# Patient Record
Sex: FEMALE | Race: WHITE | NOT HISPANIC OR LATINO | Employment: OTHER | URBAN - METROPOLITAN AREA
[De-identification: names, ages, dates, MRNs, and addresses within clinical notes are randomized per-mention and may not be internally consistent; named-entity substitution may affect disease eponyms.]

---

## 2017-11-17 ENCOUNTER — APPOINTMENT (OUTPATIENT)
Dept: PREADMISSION TESTING | Facility: HOSPITAL | Age: 62
End: 2017-11-17

## 2017-11-17 VITALS — HEIGHT: 64 IN | BODY MASS INDEX: 33.12 KG/M2 | WEIGHT: 194 LBS

## 2017-11-17 LAB
DEPRECATED RDW RBC AUTO: 44.4 FL (ref 37–54)
ERYTHROCYTE [DISTWIDTH] IN BLOOD BY AUTOMATED COUNT: 12.9 % (ref 11.3–14.5)
HBA1C MFR BLD: 5.8 % (ref 4.8–5.6)
HCT VFR BLD AUTO: 36.7 % (ref 34.5–44)
HGB BLD-MCNC: 12.1 G/DL (ref 11.5–15.5)
MCH RBC QN AUTO: 31.1 PG (ref 27–31)
MCHC RBC AUTO-ENTMCNC: 33 G/DL (ref 32–36)
MCV RBC AUTO: 94.3 FL (ref 80–99)
PLATELET # BLD AUTO: 233 10*3/MM3 (ref 150–450)
PMV BLD AUTO: 9.6 FL (ref 6–12)
POTASSIUM BLD-SCNC: 4.6 MMOL/L (ref 3.5–5.5)
RBC # BLD AUTO: 3.89 10*6/MM3 (ref 3.89–5.14)
WBC NRBC COR # BLD: 5.91 10*3/MM3 (ref 3.5–10.8)

## 2017-11-17 PROCEDURE — 93005 ELECTROCARDIOGRAM TRACING: CPT

## 2017-11-17 PROCEDURE — 93010 ELECTROCARDIOGRAM REPORT: CPT | Performed by: INTERNAL MEDICINE

## 2017-11-17 PROCEDURE — 84132 ASSAY OF SERUM POTASSIUM: CPT | Performed by: ANESTHESIOLOGY

## 2017-11-17 PROCEDURE — 85027 COMPLETE CBC AUTOMATED: CPT | Performed by: ANESTHESIOLOGY

## 2017-11-17 PROCEDURE — 83036 HEMOGLOBIN GLYCOSYLATED A1C: CPT | Performed by: ANESTHESIOLOGY

## 2017-11-17 PROCEDURE — 36415 COLL VENOUS BLD VENIPUNCTURE: CPT

## 2017-11-17 RX ORDER — HYDROCODONE BITARTRATE AND ACETAMINOPHEN 7.5; 325 MG/1; MG/1
1 TABLET ORAL EVERY 8 HOURS PRN
COMMUNITY
End: 2017-11-25 | Stop reason: HOSPADM

## 2017-11-17 RX ORDER — PRAVASTATIN SODIUM 20 MG
20 TABLET ORAL DAILY
COMMUNITY

## 2017-11-17 RX ORDER — PANTOPRAZOLE SODIUM 40 MG/1
40 TABLET, DELAYED RELEASE ORAL DAILY
COMMUNITY
End: 2019-10-08

## 2017-11-17 RX ORDER — ASPIRIN 81 MG/1
81 TABLET ORAL DAILY
Status: ON HOLD | COMMUNITY
End: 2017-11-25

## 2017-11-17 RX ORDER — SOLIFENACIN SUCCINATE 10 MG/1
10 TABLET, FILM COATED ORAL DAILY
COMMUNITY
End: 2019-10-08

## 2017-11-17 RX ORDER — TEMAZEPAM 30 MG/1
30 CAPSULE ORAL NIGHTLY PRN
COMMUNITY

## 2017-11-17 RX ORDER — LEVOCETIRIZINE DIHYDROCHLORIDE 5 MG/1
5 TABLET, FILM COATED ORAL EVERY EVENING
COMMUNITY

## 2017-11-17 RX ORDER — FLUOXETINE HYDROCHLORIDE 20 MG/1
20 CAPSULE ORAL DAILY
COMMUNITY

## 2017-11-17 RX ORDER — MELATONIN
1000 DAILY
COMMUNITY
End: 2019-10-08

## 2017-11-17 NOTE — PAT
ABNORMAL EKG REVIEWED WITH DR DIAZ. CARDIOLOGY CONSULT ORDERED. PATIENT SEEN BY DR ROLLINS IN 2011.     APPOINTMENT SCHEDULED WITH DR ROLLINS FOR Tuesday 11/21/2017 AT 1415 FOR CARDIAC CLEARANCE. PATIENT AND  VERBALIZED UNDERSTANDING     Measured for TEDS/SCDS in PAT-     calf measurement      15    Length measurement  18

## 2017-11-17 NOTE — PAT
MESSAGE LEFT FOR SHABANA IN DR ARELLANO'S OFFICE REGARDING PATIENT'S APPOINTMENT WITH DR ROLLINS 11/21/2017 FOR CARDIAC CLEARANCE.

## 2017-11-24 ENCOUNTER — APPOINTMENT (OUTPATIENT)
Dept: GENERAL RADIOLOGY | Facility: HOSPITAL | Age: 62
End: 2017-11-24

## 2017-11-24 ENCOUNTER — HOSPITAL ENCOUNTER (OUTPATIENT)
Facility: HOSPITAL | Age: 62
Discharge: HOME | End: 2017-11-25
Attending: ORTHOPAEDIC SURGERY | Admitting: ORTHOPAEDIC SURGERY

## 2017-11-24 ENCOUNTER — ANESTHESIA (OUTPATIENT)
Dept: PERIOP | Facility: HOSPITAL | Age: 62
End: 2017-11-24

## 2017-11-24 ENCOUNTER — ANESTHESIA EVENT (OUTPATIENT)
Dept: PERIOP | Facility: HOSPITAL | Age: 62
End: 2017-11-24

## 2017-11-24 PROBLEM — M50.20 HERNIATED DISC, CERVICAL: Status: ACTIVE | Noted: 2017-11-24

## 2017-11-24 PROBLEM — E78.5 HYPERLIPIDEMIA: Status: ACTIVE | Noted: 2017-11-24

## 2017-11-24 LAB
GLUCOSE BLDC GLUCOMTR-MCNC: 111 MG/DL (ref 70–130)
POTASSIUM BLDA-SCNC: 4 MMOL/L (ref 3.5–5.3)

## 2017-11-24 PROCEDURE — 94799 UNLISTED PULMONARY SVC/PX: CPT

## 2017-11-24 PROCEDURE — 25010000003 CEFAZOLIN IN DEXTROSE 2-4 GM/100ML-% SOLUTION: Performed by: ORTHOPAEDIC SURGERY

## 2017-11-24 PROCEDURE — 82962 GLUCOSE BLOOD TEST: CPT

## 2017-11-24 PROCEDURE — 25010000002 ONDANSETRON PER 1 MG: Performed by: NURSE ANESTHETIST, CERTIFIED REGISTERED

## 2017-11-24 PROCEDURE — 25010000002 FENTANYL CITRATE (PF) 100 MCG/2ML SOLUTION: Performed by: ANESTHESIOLOGY

## 2017-11-24 PROCEDURE — 25010000002 PROPOFOL 10 MG/ML EMULSION: Performed by: NURSE ANESTHETIST, CERTIFIED REGISTERED

## 2017-11-24 PROCEDURE — 25810000003 SODIUM CHLORIDE 0.9 % WITH KCL 20 MEQ 20-0.9 MEQ/L-% SOLUTION: Performed by: ORTHOPAEDIC SURGERY

## 2017-11-24 PROCEDURE — C1713 ANCHOR/SCREW BN/BN,TIS/BN: HCPCS | Performed by: ORTHOPAEDIC SURGERY

## 2017-11-24 PROCEDURE — 72020 X-RAY EXAM OF SPINE 1 VIEW: CPT

## 2017-11-24 PROCEDURE — 25010000002 DEXAMETHASONE PER 1 MG: Performed by: NURSE ANESTHETIST, CERTIFIED REGISTERED

## 2017-11-24 PROCEDURE — L0120 CERV FLEX N/ADJ FOAM PRE OTS: HCPCS | Performed by: ORTHOPAEDIC SURGERY

## 2017-11-24 PROCEDURE — 25010000002 FENTANYL CITRATE (PF) 100 MCG/2ML SOLUTION: Performed by: NURSE ANESTHETIST, CERTIFIED REGISTERED

## 2017-11-24 PROCEDURE — 84132 ASSAY OF SERUM POTASSIUM: CPT | Performed by: ORTHOPAEDIC SURGERY

## 2017-11-24 PROCEDURE — 63710000001 DEXAMETHASONE PER 0.25 MG: Performed by: ORTHOPAEDIC SURGERY

## 2017-11-24 DEVICE — PLT SKYLINE 1LEVEL 14MM: Type: IMPLANTABLE DEVICE | Site: SPINE CERVICAL | Status: FUNCTIONAL

## 2017-11-24 DEVICE — ALLOGRFT BONE VIVIGEN CELLUAR MATRX FZ 1CC: Type: IMPLANTABLE DEVICE | Site: SPINE CERVICAL | Status: FUNCTIONAL

## 2017-11-24 DEVICE — BENGAL SYSTEM STANDARD IMPLANT 6MM, 7 DEGREES
Type: IMPLANTABLE DEVICE | Site: SPINE CERVICAL | Status: FUNCTIONAL
Brand: BENGAL

## 2017-11-24 DEVICE — SCRW SKYLINE VAR SD 14MM: Type: IMPLANTABLE DEVICE | Site: SPINE CERVICAL | Status: FUNCTIONAL

## 2017-11-24 RX ORDER — NALOXONE HCL 0.4 MG/ML
0.4 VIAL (ML) INJECTION AS NEEDED
Status: DISCONTINUED | OUTPATIENT
Start: 2017-11-24 | End: 2017-11-24 | Stop reason: HOSPADM

## 2017-11-24 RX ORDER — FAMOTIDINE 10 MG/ML
20 INJECTION, SOLUTION INTRAVENOUS EVERY 12 HOURS SCHEDULED
Status: DISCONTINUED | OUTPATIENT
Start: 2017-11-24 | End: 2017-11-25 | Stop reason: HOSPADM

## 2017-11-24 RX ORDER — FAMOTIDINE 20 MG/1
20 TABLET, FILM COATED ORAL EVERY 12 HOURS SCHEDULED
Status: DISCONTINUED | OUTPATIENT
Start: 2017-11-24 | End: 2017-11-25 | Stop reason: HOSPADM

## 2017-11-24 RX ORDER — DEXAMETHASONE SODIUM PHOSPHATE 4 MG/ML
INJECTION, SOLUTION INTRA-ARTICULAR; INTRALESIONAL; INTRAMUSCULAR; INTRAVENOUS; SOFT TISSUE AS NEEDED
Status: DISCONTINUED | OUTPATIENT
Start: 2017-11-24 | End: 2017-11-24 | Stop reason: SURG

## 2017-11-24 RX ORDER — CETIRIZINE HYDROCHLORIDE 10 MG/1
10 TABLET ORAL DAILY
Status: DISCONTINUED | OUTPATIENT
Start: 2017-11-24 | End: 2017-11-25 | Stop reason: HOSPADM

## 2017-11-24 RX ORDER — ONDANSETRON 2 MG/ML
INJECTION INTRAMUSCULAR; INTRAVENOUS AS NEEDED
Status: DISCONTINUED | OUTPATIENT
Start: 2017-11-24 | End: 2017-11-24 | Stop reason: SURG

## 2017-11-24 RX ORDER — FENTANYL CITRATE 50 UG/ML
50 INJECTION, SOLUTION INTRAMUSCULAR; INTRAVENOUS
Status: DISCONTINUED | OUTPATIENT
Start: 2017-11-24 | End: 2017-11-24 | Stop reason: HOSPADM

## 2017-11-24 RX ORDER — TEMAZEPAM 15 MG/1
30 CAPSULE ORAL NIGHTLY PRN
Status: DISCONTINUED | OUTPATIENT
Start: 2017-11-24 | End: 2017-11-25 | Stop reason: HOSPADM

## 2017-11-24 RX ORDER — HYDROMORPHONE HYDROCHLORIDE 1 MG/ML
0.5 INJECTION, SOLUTION INTRAMUSCULAR; INTRAVENOUS; SUBCUTANEOUS
Status: DISCONTINUED | OUTPATIENT
Start: 2017-11-24 | End: 2017-11-24 | Stop reason: HOSPADM

## 2017-11-24 RX ORDER — ONDANSETRON 2 MG/ML
4 INJECTION INTRAMUSCULAR; INTRAVENOUS ONCE AS NEEDED
Status: DISCONTINUED | OUTPATIENT
Start: 2017-11-24 | End: 2017-11-24 | Stop reason: HOSPADM

## 2017-11-24 RX ORDER — MELATONIN
1000 DAILY
Status: DISCONTINUED | OUTPATIENT
Start: 2017-11-24 | End: 2017-11-25 | Stop reason: HOSPADM

## 2017-11-24 RX ORDER — ACETAMINOPHEN 325 MG/1
650 TABLET ORAL EVERY 4 HOURS PRN
Status: DISCONTINUED | OUTPATIENT
Start: 2017-11-24 | End: 2017-11-25 | Stop reason: HOSPADM

## 2017-11-24 RX ORDER — PANTOPRAZOLE SODIUM 40 MG/1
40 TABLET, DELAYED RELEASE ORAL DAILY
Status: DISCONTINUED | OUTPATIENT
Start: 2017-11-25 | End: 2017-11-25 | Stop reason: HOSPADM

## 2017-11-24 RX ORDER — HYDRALAZINE HYDROCHLORIDE 20 MG/ML
5 INJECTION INTRAMUSCULAR; INTRAVENOUS
Status: DISCONTINUED | OUTPATIENT
Start: 2017-11-24 | End: 2017-11-24 | Stop reason: HOSPADM

## 2017-11-24 RX ORDER — SODIUM CHLORIDE 0.9 % (FLUSH) 0.9 %
1-10 SYRINGE (ML) INJECTION AS NEEDED
Status: DISCONTINUED | OUTPATIENT
Start: 2017-11-24 | End: 2017-11-25 | Stop reason: HOSPADM

## 2017-11-24 RX ORDER — OXYCODONE AND ACETAMINOPHEN 7.5; 325 MG/1; MG/1
1 TABLET ORAL ONCE AS NEEDED
Status: DISCONTINUED | OUTPATIENT
Start: 2017-11-24 | End: 2017-11-24 | Stop reason: HOSPADM

## 2017-11-24 RX ORDER — LIDOCAINE HYDROCHLORIDE 10 MG/ML
INJECTION, SOLUTION EPIDURAL; INFILTRATION; INTRACAUDAL; PERINEURAL AS NEEDED
Status: DISCONTINUED | OUTPATIENT
Start: 2017-11-24 | End: 2017-11-24 | Stop reason: SURG

## 2017-11-24 RX ORDER — DEXAMETHASONE SODIUM PHOSPHATE 4 MG/ML
4 INJECTION, SOLUTION INTRA-ARTICULAR; INTRALESIONAL; INTRAMUSCULAR; INTRAVENOUS; SOFT TISSUE EVERY 6 HOURS SCHEDULED
Status: DISCONTINUED | OUTPATIENT
Start: 2017-11-24 | End: 2017-11-25 | Stop reason: HOSPADM

## 2017-11-24 RX ORDER — LIDOCAINE HYDROCHLORIDE 10 MG/ML
0.5 INJECTION, SOLUTION EPIDURAL; INFILTRATION; INTRACAUDAL; PERINEURAL ONCE AS NEEDED
Status: COMPLETED | OUTPATIENT
Start: 2017-11-24 | End: 2017-11-24

## 2017-11-24 RX ORDER — PROMETHAZINE HYDROCHLORIDE 25 MG/1
25 SUPPOSITORY RECTAL ONCE AS NEEDED
Status: DISCONTINUED | OUTPATIENT
Start: 2017-11-24 | End: 2017-11-24 | Stop reason: HOSPADM

## 2017-11-24 RX ORDER — BISACODYL 10 MG
10 SUPPOSITORY, RECTAL RECTAL DAILY PRN
Status: DISCONTINUED | OUTPATIENT
Start: 2017-11-24 | End: 2017-11-25 | Stop reason: HOSPADM

## 2017-11-24 RX ORDER — ATORVASTATIN CALCIUM 10 MG/1
10 TABLET, FILM COATED ORAL NIGHTLY
Status: DISCONTINUED | OUTPATIENT
Start: 2017-11-24 | End: 2017-11-25 | Stop reason: HOSPADM

## 2017-11-24 RX ORDER — SODIUM CHLORIDE 0.9 % (FLUSH) 0.9 %
1-10 SYRINGE (ML) INJECTION AS NEEDED
Status: DISCONTINUED | OUTPATIENT
Start: 2017-11-24 | End: 2017-11-24 | Stop reason: HOSPADM

## 2017-11-24 RX ORDER — FENTANYL CITRATE 50 UG/ML
INJECTION, SOLUTION INTRAMUSCULAR; INTRAVENOUS AS NEEDED
Status: DISCONTINUED | OUTPATIENT
Start: 2017-11-24 | End: 2017-11-24 | Stop reason: SURG

## 2017-11-24 RX ORDER — IPRATROPIUM BROMIDE AND ALBUTEROL SULFATE 2.5; .5 MG/3ML; MG/3ML
3 SOLUTION RESPIRATORY (INHALATION) ONCE AS NEEDED
Status: DISCONTINUED | OUTPATIENT
Start: 2017-11-24 | End: 2017-11-24 | Stop reason: HOSPADM

## 2017-11-24 RX ORDER — ZOLPIDEM TARTRATE 5 MG/1
5 TABLET ORAL NIGHTLY PRN
Status: DISCONTINUED | OUTPATIENT
Start: 2017-11-24 | End: 2017-11-24

## 2017-11-24 RX ORDER — PROMETHAZINE HYDROCHLORIDE 25 MG/ML
6.25 INJECTION, SOLUTION INTRAMUSCULAR; INTRAVENOUS ONCE AS NEEDED
Status: DISCONTINUED | OUTPATIENT
Start: 2017-11-24 | End: 2017-11-24 | Stop reason: HOSPADM

## 2017-11-24 RX ORDER — OXYBUTYNIN CHLORIDE 5 MG/1
10 TABLET, EXTENDED RELEASE ORAL DAILY
Status: DISCONTINUED | OUTPATIENT
Start: 2017-11-25 | End: 2017-11-25 | Stop reason: HOSPADM

## 2017-11-24 RX ORDER — FENTANYL CITRATE 50 UG/ML
100 INJECTION, SOLUTION INTRAMUSCULAR; INTRAVENOUS ONCE
Status: COMPLETED | OUTPATIENT
Start: 2017-11-24 | End: 2017-11-24

## 2017-11-24 RX ORDER — CEFAZOLIN SODIUM 2 G/100ML
2 INJECTION, SOLUTION INTRAVENOUS ONCE
Status: COMPLETED | OUTPATIENT
Start: 2017-11-24 | End: 2017-11-24

## 2017-11-24 RX ORDER — HYDROMORPHONE HCL 110MG/55ML
2 PATIENT CONTROLLED ANALGESIA SYRINGE INTRAVENOUS EVERY 4 HOURS PRN
Status: DISCONTINUED | OUTPATIENT
Start: 2017-11-24 | End: 2017-11-25 | Stop reason: HOSPADM

## 2017-11-24 RX ORDER — LABETALOL HYDROCHLORIDE 5 MG/ML
5 INJECTION, SOLUTION INTRAVENOUS
Status: DISCONTINUED | OUTPATIENT
Start: 2017-11-24 | End: 2017-11-24 | Stop reason: HOSPADM

## 2017-11-24 RX ORDER — SODIUM CHLORIDE, SODIUM LACTATE, POTASSIUM CHLORIDE, CALCIUM CHLORIDE 600; 310; 30; 20 MG/100ML; MG/100ML; MG/100ML; MG/100ML
9 INJECTION, SOLUTION INTRAVENOUS CONTINUOUS PRN
Status: DISCONTINUED | OUTPATIENT
Start: 2017-11-24 | End: 2017-11-24 | Stop reason: HOSPADM

## 2017-11-24 RX ORDER — CEFAZOLIN SODIUM 2 G/100ML
2 INJECTION, SOLUTION INTRAVENOUS EVERY 8 HOURS
Status: COMPLETED | OUTPATIENT
Start: 2017-11-24 | End: 2017-11-25

## 2017-11-24 RX ORDER — OXYCODONE AND ACETAMINOPHEN 7.5; 325 MG/1; MG/1
1 TABLET ORAL EVERY 4 HOURS PRN
Status: DISCONTINUED | OUTPATIENT
Start: 2017-11-24 | End: 2017-11-25 | Stop reason: HOSPADM

## 2017-11-24 RX ORDER — ATRACURIUM BESYLATE 10 MG/ML
INJECTION, SOLUTION INTRAVENOUS AS NEEDED
Status: DISCONTINUED | OUTPATIENT
Start: 2017-11-24 | End: 2017-11-24 | Stop reason: SURG

## 2017-11-24 RX ORDER — HYDRALAZINE HYDROCHLORIDE 20 MG/ML
10 INJECTION INTRAMUSCULAR; INTRAVENOUS EVERY 6 HOURS PRN
Status: DISCONTINUED | OUTPATIENT
Start: 2017-11-24 | End: 2017-11-25 | Stop reason: HOSPADM

## 2017-11-24 RX ORDER — ONDANSETRON 2 MG/ML
4 INJECTION INTRAMUSCULAR; INTRAVENOUS EVERY 6 HOURS PRN
Status: DISCONTINUED | OUTPATIENT
Start: 2017-11-24 | End: 2017-11-25 | Stop reason: HOSPADM

## 2017-11-24 RX ORDER — BENZOIN/ALOE VERA/STORAX/TOLU 10-2-8-4%
TINCTURE TOPICAL AS NEEDED
Status: DISCONTINUED | OUTPATIENT
Start: 2017-11-24 | End: 2017-11-24 | Stop reason: HOSPADM

## 2017-11-24 RX ORDER — FLUOXETINE HYDROCHLORIDE 20 MG/1
20 CAPSULE ORAL DAILY
Status: DISCONTINUED | OUTPATIENT
Start: 2017-11-25 | End: 2017-11-25 | Stop reason: HOSPADM

## 2017-11-24 RX ORDER — SODIUM CHLORIDE AND POTASSIUM CHLORIDE 150; 900 MG/100ML; MG/100ML
100 INJECTION, SOLUTION INTRAVENOUS CONTINUOUS
Status: DISCONTINUED | OUTPATIENT
Start: 2017-11-24 | End: 2017-11-25 | Stop reason: HOSPADM

## 2017-11-24 RX ORDER — GLYCOPYRROLATE 0.2 MG/ML
INJECTION INTRAMUSCULAR; INTRAVENOUS AS NEEDED
Status: DISCONTINUED | OUTPATIENT
Start: 2017-11-24 | End: 2017-11-24 | Stop reason: SURG

## 2017-11-24 RX ORDER — OXYCODONE HYDROCHLORIDE AND ACETAMINOPHEN 5; 325 MG/1; MG/1
1 TABLET ORAL ONCE AS NEEDED
Status: DISCONTINUED | OUTPATIENT
Start: 2017-11-24 | End: 2017-11-24 | Stop reason: HOSPADM

## 2017-11-24 RX ORDER — FAMOTIDINE 20 MG/1
20 TABLET, FILM COATED ORAL
Status: DISCONTINUED | OUTPATIENT
Start: 2017-11-24 | End: 2017-11-24 | Stop reason: HOSPADM

## 2017-11-24 RX ORDER — PROPOFOL 10 MG/ML
VIAL (ML) INTRAVENOUS AS NEEDED
Status: DISCONTINUED | OUTPATIENT
Start: 2017-11-24 | End: 2017-11-24 | Stop reason: SURG

## 2017-11-24 RX ORDER — PROMETHAZINE HYDROCHLORIDE 25 MG/1
25 TABLET ORAL ONCE AS NEEDED
Status: DISCONTINUED | OUTPATIENT
Start: 2017-11-24 | End: 2017-11-24 | Stop reason: HOSPADM

## 2017-11-24 RX ORDER — HYDROCODONE BITARTRATE AND ACETAMINOPHEN 7.5; 325 MG/1; MG/1
1 TABLET ORAL EVERY 4 HOURS PRN
Status: DISCONTINUED | OUTPATIENT
Start: 2017-11-24 | End: 2017-11-25 | Stop reason: HOSPADM

## 2017-11-24 RX ORDER — MAGNESIUM HYDROXIDE 1200 MG/15ML
LIQUID ORAL AS NEEDED
Status: DISCONTINUED | OUTPATIENT
Start: 2017-11-24 | End: 2017-11-24 | Stop reason: HOSPADM

## 2017-11-24 RX ORDER — MEPERIDINE HYDROCHLORIDE 25 MG/ML
12.5 INJECTION INTRAMUSCULAR; INTRAVENOUS; SUBCUTANEOUS
Status: DISCONTINUED | OUTPATIENT
Start: 2017-11-24 | End: 2017-11-24 | Stop reason: HOSPADM

## 2017-11-24 RX ORDER — NALOXONE HCL 0.4 MG/ML
0.4 VIAL (ML) INJECTION
Status: DISCONTINUED | OUTPATIENT
Start: 2017-11-24 | End: 2017-11-25 | Stop reason: HOSPADM

## 2017-11-24 RX ORDER — BISACODYL 5 MG/1
5 TABLET, DELAYED RELEASE ORAL DAILY PRN
Status: DISCONTINUED | OUTPATIENT
Start: 2017-11-24 | End: 2017-11-25 | Stop reason: HOSPADM

## 2017-11-24 RX ORDER — DEXAMETHASONE 4 MG/1
4 TABLET ORAL EVERY 6 HOURS SCHEDULED
Status: DISCONTINUED | OUTPATIENT
Start: 2017-11-24 | End: 2017-11-25 | Stop reason: HOSPADM

## 2017-11-24 RX ADMIN — LIDOCAINE HYDROCHLORIDE 0.5 ML: 10 INJECTION, SOLUTION EPIDURAL; INFILTRATION; INTRACAUDAL; PERINEURAL at 06:35

## 2017-11-24 RX ADMIN — CEFAZOLIN SODIUM 2 G: 2 INJECTION, SOLUTION INTRAVENOUS at 23:39

## 2017-11-24 RX ADMIN — TEMAZEPAM 30 MG: 15 CAPSULE ORAL at 21:07

## 2017-11-24 RX ADMIN — METOPROLOL TARTRATE 25 MG: 25 TABLET ORAL at 17:35

## 2017-11-24 RX ADMIN — ONDANSETRON 4 MG: 2 INJECTION INTRAMUSCULAR; INTRAVENOUS at 08:45

## 2017-11-24 RX ADMIN — FAMOTIDINE 20 MG: 20 TABLET, FILM COATED ORAL at 06:33

## 2017-11-24 RX ADMIN — PROPOFOL 160 MG: 10 INJECTION, EMULSION INTRAVENOUS at 07:34

## 2017-11-24 RX ADMIN — FENTANYL CITRATE 50 MCG: 50 INJECTION INTRAMUSCULAR; INTRAVENOUS at 10:15

## 2017-11-24 RX ADMIN — ATRACURIUM BESYLATE 40 MG: 10 INJECTION, SOLUTION INTRAVENOUS at 07:34

## 2017-11-24 RX ADMIN — VITAMIN D, TAB 1000IU (100/BT) 1000 UNITS: 25 TAB at 13:07

## 2017-11-24 RX ADMIN — CEFAZOLIN SODIUM 2 G: 2 INJECTION, SOLUTION INTRAVENOUS at 15:43

## 2017-11-24 RX ADMIN — FAMOTIDINE 20 MG: 20 TABLET, FILM COATED ORAL at 17:35

## 2017-11-24 RX ADMIN — CETIRIZINE HYDROCHLORIDE 10 MG: 10 TABLET, FILM COATED ORAL at 13:07

## 2017-11-24 RX ADMIN — OXYCODONE HYDROCHLORIDE AND ACETAMINOPHEN 1 TABLET: 7.5; 325 TABLET ORAL at 17:57

## 2017-11-24 RX ADMIN — GLYCOPYRROLATE 0.2 MG: 0.2 INJECTION, SOLUTION INTRAMUSCULAR; INTRAVENOUS at 08:17

## 2017-11-24 RX ADMIN — ACETAMINOPHEN 650 MG: 325 TABLET, FILM COATED ORAL at 21:07

## 2017-11-24 RX ADMIN — DEXAMETHASONE SODIUM PHOSPHATE 8 MG: 4 INJECTION, SOLUTION INTRAMUSCULAR; INTRAVENOUS at 07:42

## 2017-11-24 RX ADMIN — HYDROCODONE BITARTRATE AND ACETAMINOPHEN 1 TABLET: 7.5; 325 TABLET ORAL at 11:58

## 2017-11-24 RX ADMIN — FENTANYL CITRATE 25 MCG: 50 INJECTION, SOLUTION INTRAMUSCULAR; INTRAVENOUS at 08:58

## 2017-11-24 RX ADMIN — FENTANYL CITRATE 100 MCG: 50 INJECTION INTRAMUSCULAR; INTRAVENOUS at 06:58

## 2017-11-24 RX ADMIN — ATORVASTATIN CALCIUM 10 MG: 10 TABLET, FILM COATED ORAL at 21:07

## 2017-11-24 RX ADMIN — DEXAMETHASONE 4 MG: 4 TABLET ORAL at 17:35

## 2017-11-24 RX ADMIN — DEXAMETHASONE 4 MG: 4 TABLET ORAL at 13:07

## 2017-11-24 RX ADMIN — SODIUM CHLORIDE, POTASSIUM CHLORIDE, SODIUM LACTATE AND CALCIUM CHLORIDE 9 ML/HR: 600; 310; 30; 20 INJECTION, SOLUTION INTRAVENOUS at 06:35

## 2017-11-24 RX ADMIN — GLYCOPYRROLATE 0.2 MG: 0.2 INJECTION, SOLUTION INTRAMUSCULAR; INTRAVENOUS at 08:24

## 2017-11-24 RX ADMIN — FENTANYL CITRATE 25 MCG: 50 INJECTION, SOLUTION INTRAMUSCULAR; INTRAVENOUS at 09:17

## 2017-11-24 RX ADMIN — POTASSIUM CHLORIDE AND SODIUM CHLORIDE 100 ML/HR: 900; 150 INJECTION, SOLUTION INTRAVENOUS at 10:19

## 2017-11-24 RX ADMIN — LIDOCAINE HYDROCHLORIDE 40 MG: 10 INJECTION, SOLUTION EPIDURAL; INFILTRATION; INTRACAUDAL; PERINEURAL at 07:34

## 2017-11-24 RX ADMIN — CEFAZOLIN SODIUM 2 G: 2 INJECTION, SOLUTION INTRAVENOUS at 07:31

## 2017-11-24 RX ADMIN — DEXAMETHASONE 4 MG: 4 TABLET ORAL at 23:39

## 2017-11-24 RX ADMIN — MUPIROCIN: 20 OINTMENT TOPICAL at 06:33

## 2017-11-24 RX ADMIN — FENTANYL CITRATE 50 MCG: 50 INJECTION, SOLUTION INTRAMUSCULAR; INTRAVENOUS at 09:23

## 2017-11-24 NOTE — ANESTHESIA PROCEDURE NOTES
Airway  Urgency: elective    Airway not difficult    General Information and Staff    Patient location during procedure: OR  CRNA: JADE DANG    Indications and Patient Condition  Indications for airway management: airway protection    Preoxygenated: yes  MILS not maintained throughout  Mask difficulty assessment: 1 - vent by mask    Final Airway Details  Final airway type: endotracheal airway      Successful airway: ETT  Cuffed: yes   Successful intubation technique: direct laryngoscopy  Endotracheal tube insertion site: oral  Blade: Betty  Blade size: #3  ETT size: 7.0 mm  Cormack-Lehane Classification: grade IIa - partial view of glottis  Placement verified by: chest auscultation and capnometry   Measured from: lips  ETT to lips (cm): 20  Number of attempts at approach: 1    Additional Comments  Negative epigastric sounds, Breath sound equal bilaterally with symmetric chest rise and fall. Teeth and lips as preop.

## 2017-11-24 NOTE — OP NOTE
ACDF OPERATIVE NOTE    Pre-Operative Diagnosis:  C6-7 right sided HNP, right radicular arm pain    Post Operative Diagnosis:  Same    Procedure: #1  Anterior Cervical Discectomy and Fusion of  C6-7    #2  Interbody Cage Placed at  C6-7, packed with purified allograft    #3  Anterior Cervical Plate Spanning  C6-7     Surgeon:  Sanket Murrell MD    Assistant:  Celia Eugene PA-C    Anesthesia:  General    EBL:  20cc    Drain:  Hemovac to bulb suction    Indications:  Patient presents as a 63 yo female with severe right arm pain from the posterior shoulder to the hand. MRI shows large right sided HNP C6-7. She has failed conservative care, presents for surgery.   Risks, benefits and both operative and non operative options were discussed with the patient preoperatively.  Specific risks including, but not limited to the risk of bleeding, infection, nerve injury, blood vessel injury, weakness, paralysis and possible worsening of pain were discussed.   After informed consent and antibiotic prophylaxis, patient brought to the operating room.  After general endotracheal anesthesia patient was positioned supine with a roll under the shoulder blades and the neck in slight extension. Head held in [place] horsehoe head wyman. Arms were tucked and taped at the side.  The bony prominences were well padded.  Neck was prepped and draped in usual sterile fashion.    Description:  The neck was prepped and draped in usual sterile fashion. Incision made to the left of midline approximately 2 fingerbreadths above the clavicle in a skin fold approximately 3-4 cm in length. Sharp dissection through the platysma. Hemostasis obtained with pickups and Bovie. Platysma elevated cephalad and caudal for a couple of centimeters. Medial border of the sternocleidomastoid was identified. Dissection carried out medial to this. The carotid pulse was palpated and blunt fingertip dissection medial to this led readily to the anterior cervical  spine. Hand-held retractors were used throughout. The prevertebral fascia was thinned with a Kittner. Due to the morphology of the anterior osteophytes, I identified what I believed to be C6-C7, marked it with a Bovie, and then a bent spinal needle. Cross-table lateral x-ray confirmed this was C6-C7.    Longus colli elevated to the right and to the left exposing the inferior half of C6 and the superior half of C7. Hand-held retractors were readjusted. Anterior annulotomy performed with #15 scalpel followed by disk removal with a pituitary followed by 0 curette. The overhanging osteophyte from C6 was removed with a high-speed aguila to improve visualization. The disk was removed all the way back to the posterior longitudinal ligament. Small bone spurs removed posteriorly working from the midline into the right neural foramen. Disk fragments behind the annulus was readily identified. Removing a few more osteophytes from behind the vertebral bodies with the 3-0 curved curette, I then grasped the edge of the exposed disk fragments in the right foramen and removed a moderately large fragment, clearly coming from behind the posterior longitudinal ligament and mostly coming from behind the body of C7. Probing this area further with a blunt nerve hook I identified another disk fragment in this same region and removed another moderately large disk fragment. Again, the majority of this was behind the body of C7, At this point, I used the 3-0 curved curette to sweep through the foramen encountering no more disk fragments and then used the blunt nerve hook likewise to sweep from behind the vertebral bodies and through the right-sided foramen. No more disk fragments were identified. The disk space was thoroughly irrigated. The endplates were lightly decorticated with a high-speed aguila. I used the trials and trialed up to a 6 mm cage tapered into lordosis. This achieved a little bit of increase in the distraction at the C6 level  re-creating the normal alignment. I used an under-sized rasp to further prepare the endplates and then after thorough irrigation a carbon fiber tapered 6 mm cage was placed. It had been packed with purified allograft, tapped into position without difficulty. We then removed the 10 pounds of traction and I placed an anterior plate that spanned the C6-C7 disk level. Screws placed sequentially 14 mm. Frontal tightening achieved good purchase on all 4 screws. I rotated the locking mechanism over top of the screw heads and this was visually confirmed.    Cross-table lateral x-ray showed appropriate placement of cage, plate, and screws. Screw length was appropriate. Plate length also was appropriate. The wound was again thoroughly irrigated, drain brought out through out separate stab incision and stitched in place, and a layered closure performed with interrupted 2-0 Vicryl in the platysma followed by running 3-0 subcuticular skin stitch, Mastisol, Steri-Strips, and a sterile dressing applied. Patient transferred back to the stretcher. There were no intraoperative complications. Soft cervical collar was also applied prior to moving to the stretcher.    Postoperative plan is for observation overnight, rapid mobilization with Physical Therapy.      Complications:  None    Plan:  Patient to transfer to floor after cleared by anesthesia.  Rapid mobilization.  Consult Dr ROCK for medical management. OK to continue ASA  Anticipate home tomorrow. Walk today, PT consult if needed.      Sanket Murrell MD  11/24/17  9:09 AM

## 2017-11-24 NOTE — INTERVAL H&P NOTE
Pre-Op H&P (See Recent Office Note Attached)    Chief complaint: Neck pain into RUE    Review of Systems:  General ROS:  no fever, chills, rashes, No change since last office visit  Cardiovascular ROS: no chest pain or dyspnea on exertion.  +cardiac clearance  Respiratory ROS: no cough, shortness of breath, or wheezing    Immunization History:   Influenza:  Yes 2017  Pneumococcal:  Yes < 5 years  Tetanus:  unknown    Vital Signs:  /87 (BP Location: Right arm, Patient Position: Lying)  Pulse 64  Temp 97.6 °F (36.4 °C) (Temporal Artery )   Resp 18  SpO2 97%    Physical Exam:    CV:  S1S2 regular rate and rhythm, no murmur               Resp:  Clear to auscultation; respirations regular, even and unlabored    Results Review:    I reviewed the patient's new clinical results.    Cancer Staging (if applicable)  Cancer Patient: __ yes _x_no __unknown; If yes, clinical stage T:__ N:__M:__, stage group or __N/A    Milena Delgadillo, APRN  11/24/2017   6:35 AM

## 2017-11-24 NOTE — PLAN OF CARE
Problem: Fall Risk (Adult)  Goal: Identify Related Risk Factors and Signs and Symptoms  Outcome: Ongoing (interventions implemented as appropriate)    11/24/17 2097   Fall Risk   Fall Risk: Related Risk Factors age-related changes;environment unfamiliar   Fall Risk: Signs and Symptoms presence of risk factors

## 2017-11-24 NOTE — PLAN OF CARE
Problem: Confusion, Acute (Adult)  Goal: Cognitive/Functional Impairments Minimized  Outcome: Outcome(s) achieved Date Met:  11/24/17 11/24/17 1702   Confusion, Acute (Adult)   Cognitive/Functional Impairments Minimized achieves outcome

## 2017-11-24 NOTE — ANESTHESIA POSTPROCEDURE EVALUATION
Patient: Ирина Gutierrez    Procedure Summary     Date Anesthesia Start Anesthesia Stop Room / Location    11/24/17 0729 0926 [facility] / [facility]       Procedure Diagnosis Surgeon Provider    CERVICAL DISCECTOMY ANTERIOR WITH FUSION C6-7 CAGE PLATE ALLOGRAFT (N/A Spine Cervical) No diagnosis on file. MD Judah Wade MD          Anesthesia Type: general  Last vitals  BP   144/69   Temp 97.5   Pulse 66   Resp 16   SpO2 95%     Post Anesthesia Care and Evaluation    Patient location during evaluation: PACU  Patient participation: complete - patient participated  Level of consciousness: sleepy but conscious  Pain score: 0  Pain management: adequate  Airway patency: patent  Anesthetic complications: No anesthetic complications  PONV Status: none  Cardiovascular status: hemodynamically stable and acceptable  Respiratory status: nonlabored ventilation, acceptable and nasal cannula  Hydration status: acceptable

## 2017-11-24 NOTE — H&P
Patient Name: Ирина Gutierrez  MRN: [id number]  : 1955  DOS: 2017    Attending: Sanket Murrell MD    Primary Care Provider: Preston Grant MD      Chief complaint:  Shoulder and UE pain.    Subjective   Patient is a 62 y.o. female presented for scheduled surgery by .     Per his note:(  Patient presents as a 61 yo female with severe right arm pain from the posterior shoulder to the hand. MRI shows large right sided HNP C6-7. She has failed conservative care, presents for surgery. Risks, benefits and both operative and non operative options were discussed with the patient preoperatively.  Specific risks including, but not limited to the risk of bleeding, infection, nerve injury, blood vessel injury, weakness, paralysis and possible worsening of pain were discussed.)  She underwent ACDF. See below:    ACDF OPERATIVE NOTE     Pre-Operative Diagnosis:  C6-7 right sided HNP, right radicular arm pain     Post Operative Diagnosis:  Same     Procedure:               #1  Anterior Cervical Discectomy and Fusion of  C6-7                                              #2  Interbody Cage Placed at  C6-7, packed with purified allograft                                              #3  Anterior Cervical Plate Spanning  C6-7      Surgeon:  Sanket Murrell MD    She tolerated surgery well and is admitted for further management. When I saw her in her room, she is doing well. Right shoulder and RUE pain has resolved postop. No f/c/n/vom/sob. Incisional pain is mild . She has already ambulated.     Allergies:  Allergies   Allergen Reactions   • Augmentin [Amoxicillin-Pot Clavulanate] Nausea Only   • Bactrim [Sulfamethoxazole-Trimethoprim] Rash       Meds:  Prescriptions Prior to Admission   Medication Sig Dispense Refill Last Dose   • aspirin 81 MG EC tablet Take 81 mg by mouth Daily.   2017 at 0400   • cholecalciferol (VITAMIN D3) 1000 units tablet Take 1,000 Units by mouth Daily.   2017 at  0800   • FLUoxetine (PROzac) 20 MG capsule Take 20 mg by mouth Daily.   11/24/2017 at 0400   • HYDROcodone-acetaminophen (NORCO) 7.5-325 MG per tablet Take 1 tablet by mouth Every 8 (Eight) Hours As Needed for Moderate Pain .   11/23/2017 at 1600   • levocetirizine (XYZAL) 5 MG tablet Take 5 mg by mouth Every Evening.   11/23/2017 at 2000   • metoprolol tartrate (LOPRESSOR) 25 MG tablet Take 25 mg by mouth 2 (Two) Times a Day.   11/24/2017 at 0400   • pantoprazole (PROTONIX) 40 MG EC tablet Take 40 mg by mouth Daily.   11/24/2017 at 0400   • pentosan polysulfate (ELMIRON) 100 MG capsule Take 100 mg by mouth 2 (Two) Times a Day.   11/23/2017 at 2000   • pravastatin (PRAVACHOL) 20 MG tablet Take 20 mg by mouth Daily.   11/23/2017 at 2000   • solifenacin (VESICARE) 10 MG tablet Take 10 mg by mouth Daily.   11/24/2017 at 0400   • temazepam (RESTORIL) 30 MG capsule Take 30 mg by mouth At Night As Needed for Sleep.   Past Month at Unknown time         History:   Past Medical History:   Diagnosis Date   • Arthritis    • High cholesterol    • Interstitial cystitis    • LBBB (left bundle branch block)    • Neck pain    • Squamous cell carcinoma in situ     REMOVED FROM BOTH SHOULDERS, LUE, RLE, AND NOSE    • TIA (transient ischemic attack) 2011    NO RESIDUAL PROBLEMS PER ANXIETY     Past Surgical History:   Procedure Laterality Date   • CATARACT EXTRACTION  2017   • COLONOSCOPY  2016   • ENDOSCOPY     • FINGER SURGERY Right     THIRD AND FOURTH FINGER RIGHT HAND    • HYSTERECTOMY  1986     History reviewed. No pertinent family history.  Social History   Substance Use Topics   • Smoking status: Former Smoker     Years: 20.00     Types: Cigarettes   • Smokeless tobacco: Never Used      Comment: QUIT SMOKING 2015--AVERAGE 7 CIG PER DAY    • Alcohol use No       Review of Systems  Pertinent items are noted in HPI, all other systems reviewed and negative    Vital Signs  /64  Pulse 63  Temp 97.7 °F (36.5 °C) (Oral)    "Resp 16  Ht 64\" (162.6 cm)  Wt 194 lb (88 kg)  SpO2 94%  BMI 33.3 kg/m2    Physical Exam:    General Appearance:    Alert, cooperative, in no acute distress   Head:    Normocephalic, without obvious abnormality, atraumatic   Eyes:            Lids and lashes normal, conjunctivae and sclerae normal, no   icterus, no pallor, corneas clear, PERRLA   Ears:    Ears appear intact with no abnormalities noted   Throat:   No oral lesions, no thrush, oral mucosa moist   Neck:   Soft C collar. Clean dressing . Drain present.    Lungs:     Clear to auscultation,respirations regular, even and                   unlabored    Heart:    Regular rhythm and normal rate, normal S1 and S2, no            murmur, no gallop, no rub, no click   Abdomen:     Normal bowel sounds, no masses, no organomegaly, soft        non-tender, non-distended, no guarding, no rebound                 tenderness   Genitalia:    Deferred   Extremities:   Moves all extremities well, no edema, no cyanosis, no              redness   Pulses:   Pulses palpable and equal bilaterally   Skin:   No bleeding, bruising or rash   Neurologic:   Cranial nerves 2 - 12 grossly intact, sensation intact, DTR        present and equal bilaterally      I reviewed the patient's new clinical results.         Results from last 7 days  Lab Units 11/17/17  1505   WBC 10*3/mm3 5.91   HEMOGLOBIN g/dL 12.1   HEMATOCRIT % 36.7   PLATELETS 10*3/mm3 233       Results from last 7 days  Lab Units 11/17/17  1505   POTASSIUM mmol/L 4.6     Lab Results   Component Value Date    HGBA1C 5.80 (H) 11/17/2017           Assessment and Plan:     Active Problems:    Herniated disc, cervical , s/p ACDF.    Hyperlipidemia    Hx of TIA.        Plan  1. Ambulate. Encouraged.   2. Pain control-prns   3. IS-encouraged, instructed on use.   4. DVT proph- Mech  5. Bowel regimen  6. Resume home medications as appropriate  7. Monitor post-op labs  8. DC planning for home, possibly tomorrow.     Discussed with " patient and her .     Michael Haney MD  11/24/17  12:48 PM

## 2017-11-24 NOTE — ANESTHESIA PREPROCEDURE EVALUATION
Anesthesia Evaluation     Patient summary reviewed and Nursing notes reviewed   NPO Solid Status: > 8 hours  NPO Liquid Status: > 8 hours     Airway   Mallampati: I  TM distance: <3 FB  Neck ROM: full  no difficulty expected  Dental - normal exam     Pulmonary - negative pulmonary ROS and normal exam   Cardiovascular - normal exam    (+) dysrhythmias (LBBB), hyperlipidemia      Neuro/Psych  (+) TIA,    GI/Hepatic/Renal/Endo - negative ROS     Musculoskeletal     (+) neck pain,   Abdominal  - normal exam    Bowel sounds: normal.   Substance History - negative use     OB/GYN negative ob/gyn ROS         Other   (+) arthritis                                     Anesthesia Plan    ASA 3     general     intravenous induction   Anesthetic plan and risks discussed with patient.    Plan discussed with CRNA.

## 2017-11-25 VITALS
TEMPERATURE: 98.9 F | DIASTOLIC BLOOD PRESSURE: 63 MMHG | HEIGHT: 64 IN | RESPIRATION RATE: 17 BRPM | SYSTOLIC BLOOD PRESSURE: 137 MMHG | OXYGEN SATURATION: 96 % | BODY MASS INDEX: 33.12 KG/M2 | HEART RATE: 68 BPM | WEIGHT: 194 LBS

## 2017-11-25 LAB
ANION GAP SERPL CALCULATED.3IONS-SCNC: 8 MMOL/L (ref 3–11)
BUN BLD-MCNC: 11 MG/DL (ref 9–23)
BUN/CREAT SERPL: 18.3 (ref 7–25)
CALCIUM SPEC-SCNC: 9.4 MG/DL (ref 8.7–10.4)
CHLORIDE SERPL-SCNC: 106 MMOL/L (ref 99–109)
CO2 SERPL-SCNC: 25 MMOL/L (ref 20–31)
CREAT BLD-MCNC: 0.6 MG/DL (ref 0.6–1.3)
DEPRECATED RDW RBC AUTO: 46.5 FL (ref 37–54)
ERYTHROCYTE [DISTWIDTH] IN BLOOD BY AUTOMATED COUNT: 13.4 % (ref 11.3–14.5)
GFR SERPL CREATININE-BSD FRML MDRD: 101 ML/MIN/1.73
GLUCOSE BLD-MCNC: 144 MG/DL (ref 70–100)
HCT VFR BLD AUTO: 36.9 % (ref 34.5–44)
HGB BLD-MCNC: 12.1 G/DL (ref 11.5–15.5)
MCH RBC QN AUTO: 31 PG (ref 27–31)
MCHC RBC AUTO-ENTMCNC: 32.8 G/DL (ref 32–36)
MCV RBC AUTO: 94.6 FL (ref 80–99)
PLATELET # BLD AUTO: 216 10*3/MM3 (ref 150–450)
PMV BLD AUTO: 10.5 FL (ref 6–12)
POTASSIUM BLD-SCNC: 3.8 MMOL/L (ref 3.5–5.5)
RBC # BLD AUTO: 3.9 10*6/MM3 (ref 3.89–5.14)
SODIUM BLD-SCNC: 139 MMOL/L (ref 132–146)
WBC NRBC COR # BLD: 10.71 10*3/MM3 (ref 3.5–10.8)

## 2017-11-25 PROCEDURE — 85027 COMPLETE CBC AUTOMATED: CPT | Performed by: INTERNAL MEDICINE

## 2017-11-25 PROCEDURE — 80048 BASIC METABOLIC PNL TOTAL CA: CPT | Performed by: INTERNAL MEDICINE

## 2017-11-25 PROCEDURE — 63710000001 DEXAMETHASONE PER 0.25 MG: Performed by: ORTHOPAEDIC SURGERY

## 2017-11-25 PROCEDURE — 94799 UNLISTED PULMONARY SVC/PX: CPT

## 2017-11-25 RX ORDER — ASPIRIN 81 MG/1
81 TABLET ORAL DAILY
Status: ON HOLD
Start: 2017-11-26 | End: 2019-10-18 | Stop reason: SDUPTHER

## 2017-11-25 RX ORDER — HYDROCODONE BITARTRATE AND ACETAMINOPHEN 7.5; 325 MG/1; MG/1
1 TABLET ORAL EVERY 4 HOURS PRN
Qty: 30 TABLET | Refills: 0 | Status: SHIPPED | OUTPATIENT
Start: 2017-11-25 | End: 2019-10-18 | Stop reason: HOSPADM

## 2017-11-25 RX ADMIN — DEXAMETHASONE 4 MG: 4 TABLET ORAL at 05:31

## 2017-11-25 RX ADMIN — PANTOPRAZOLE SODIUM 40 MG: 40 TABLET, DELAYED RELEASE ORAL at 08:27

## 2017-11-25 RX ADMIN — CETIRIZINE HYDROCHLORIDE 10 MG: 10 TABLET, FILM COATED ORAL at 08:28

## 2017-11-25 RX ADMIN — METOPROLOL TARTRATE 25 MG: 25 TABLET ORAL at 08:28

## 2017-11-25 RX ADMIN — FLUOXETINE HYDROCHLORIDE 20 MG: 20 CAPSULE ORAL at 08:28

## 2017-11-25 RX ADMIN — VITAMIN D, TAB 1000IU (100/BT) 1000 UNITS: 25 TAB at 08:28

## 2017-11-25 RX ADMIN — OXYBUTYNIN CHLORIDE 10 MG: 5 TABLET, EXTENDED RELEASE ORAL at 08:27

## 2017-11-25 RX ADMIN — FAMOTIDINE 20 MG: 20 TABLET, FILM COATED ORAL at 08:27

## 2017-11-25 RX ADMIN — PENTOSAN POLYSULFATE SODIUM 100 MG: 100 CAPSULE, GELATIN COATED ORAL at 08:27

## 2017-11-25 NOTE — PROGRESS NOTES
"Ortho Spine Progress Note     LOS: 0 days   Patient Care Team:  Preston Grant MD as PCP - General (Family Medicine)  Elijah Garcia MD as Consulting Physician (Interventional Cardiology)    Subjective Pt sitting up in bed, right arm /shoulder pain is gone. Pleased.    Objective     Vital Signs:  /63 (BP Location: Right arm, Patient Position: Lying)  Pulse 76  Temp 98.8 °F (37.1 °C) (Oral)   Resp 16  Ht 64\" (162.6 cm)  Wt 194 lb (88 kg)  SpO2 92%  BMI 33.3 kg/m2         Labs:  Lab Results (last 24 hours)     Procedure Component Value Units Date/Time    POC Glucose Fingerstick [[id number]]  (Normal) Collected:  11/24/17 1059    Specimen:  Blood Updated:  11/24/17 1100     Glucose 111 mg/dL     Narrative:       Meter: [id number] : [id number] Thai Leon    CBC (No Diff) [[id number]]  (Normal) Collected:  11/25/17 0442    Specimen:  Blood Updated:  11/25/17 0546     WBC 10.71 10*3/mm3      RBC 3.90 10*6/mm3      Hemoglobin 12.1 g/dL      Hematocrit 36.9 %      MCV 94.6 fL      MCH 31.0 pg      MCHC 32.8 g/dL      RDW 13.4 %      RDW-SD 46.5 fl      MPV 10.5 fL      Platelets 216 10*3/mm3     Basic Metabolic Panel [[id number]]  (Abnormal) Collected:  11/25/17 0442    Specimen:  Blood Updated:  11/25/17 0641     Glucose 144 (H) mg/dL      BUN 11 mg/dL      Creatinine 0.60 mg/dL      Sodium 139 mmol/L      Potassium 3.8 mmol/L      Chloride 106 mmol/L      CO2 25.0 mmol/L      Calcium 9.4 mg/dL      eGFR Non African Amer 101 mL/min/1.73      BUN/Creatinine Ratio 18.3     Anion Gap 8.0 mmol/L     Narrative:       [employer] Guidelines    Stage     Description        GFR  1         Normal or High     90+  2         Mild decrease      60-89  3         Moderate decrease  30-59  4         Severe decrease    15-29  5         Kidney failure     <15          Physical Exam:  Neurovascular intact.  Calves soft, non-tender.     Assessment/Plan   Doing well. D/C drain and dry dressing " change.  OK to continue ASA. Soft collar on except when showers on a non-slip surface or is awake and in a recliner.   D/C home after seen by Dr DAMON  Hydrocodone 7.5 script on chart.  F/U to see me 2 weeks.    Sanket Murrell MD  11/25/17  7:39 AM

## 2017-11-25 NOTE — DISCHARGE SUMMARY
Patient Name: Ирина Gutierrez  MRN: [id number]  : 1955  DOS: 2017    Attending: Sanket Murrell MD    Primary Care Provider: Preston Grant MD    Date of Admission:.2017  5:55 AM    Date of Discharge:  2017    Discharge Diagnosis: Active Problems:    Herniated disc, cervical, s/p ACDF    Hyperlipidemia     PMHx:  Arthritis.  High cholesterol     Interstitial cystitis     LBBB (left bundle branch block)     Neck pain     Squamous cell carcinoma in situ     REMOVED FROM BOTH SHOULDERS, LUE, RLE, AND NOSE    TIA (transient ischemic attack)          Hospital Course  At admit:    Patient is a 62 y.o. female presented for scheduled surgery by .      Per his note:(  Patient presents as a 61 yo female with severe right arm pain from the posterior shoulder to the hand. MRI shows large right sided HNP C6-7. She has failed conservative care, presents for surgery. Risks, benefits and both operative and non operative options were discussed with the patient preoperatively.  Specific risks including, but not limited to the risk of bleeding, infection, nerve injury, blood vessel injury, weakness, paralysis and possible worsening of pain were discussed.)  She underwent ACDF. See below:     ACDF OPERATIVE NOTE      Pre-Operative Diagnosis:  C6-7 right sided HNP, right radicular arm pain      Post Operative Diagnosis:  Same      Procedure:               #1  Anterior Cervical Discectomy and Fusion of  C6-7                                              #2  Interbody Cage Placed at  C6-7, packed with purified allograft                                              #3  Anterior Cervical Plate Spanning  C6-7       Surgeon:  Sanket Murrell MD    She tolerated surgery well and is admitted for further management. When I saw her in her room, she is doing well. Right shoulder and RUE pain has resolved postop. No f/c/n/vom/sob. Incisional pain is mild . She has already ambulated.     After admit:    "  Patient was provided pain medications as needed for pain control.    She Ambulated several times without difficulty.  She reports resolution of her upper extremity symptoms.    She  used an IS for atelectasis prophylaxis and mechanicals for DVT prophylaxis.  Home medications were resumed as appropriate, and labs were monitored and remained fairly stable.   With the progress she has made, pt is ready for DC home today.      Discussed with patient regarding plan and she shows understanding and agreement.    Patient will have HHPT following discharge.         Pertinent Test Results:    I reviewed the patient's new clinical results.     Results from last 7 days  Lab Units 17  0442   WBC 10*3/mm3 10.71   HEMOGLOBIN g/dL 12.1   HEMATOCRIT % 36.9   PLATELETS 10*3/mm3 216       Results from last 7 days  Lab Units 17  0442   SODIUM mmol/L 139   POTASSIUM mmol/L 3.8   CHLORIDE mmol/L 106   CO2 mmol/L 25.0   BUN mg/dL 11   CREATININE mg/dL 0.60   CALCIUM mg/dL 9.4   GLUCOSE mg/dL 144*     I reviewed the patient's new imaging including images and reports.          Discharge Assessment:    Vital Signs  /63 (BP Location: Right arm, Patient Position: Lying)  Pulse 68  Temp 98.9 °F (37.2 °C) (Oral)   Resp 17  Ht 64\" (162.6 cm)  Wt 194 lb (88 kg)  SpO2 96%  BMI 33.3 kg/m2  Temp (24hrs), Av °F (36.7 °C), Min:97.5 °F (36.4 °C), Max:98.9 °F (37.2 °C)      General Appearance:    Alert, cooperative, in no acute distress  Neck: soft c collar on. Drain present/to be removed prior to discharge.    Lungs:     Clear to auscultation,respirations regular, even and                   unlabored    Heart:    Regular rhythm and normal rate, normal S1 and S2    Abdomen:     Normal bowel sounds, no masses, no organomegaly, soft        non-tender, non-distended, no guarding, no rebound                 tenderness   Extremities:   Moves all extremities well, no edema, no cyanosis, no              redness   Pulses:   Pulses " palpable and equal bilaterally   Skin:   No bleeding, bruising or rash   Neurologic:   Cranial nerves 2 - 12 grossly intact, sensation intact,         Discharge Disposition: Home.     Discharge Medications   Ирина Gutierrez   Home Medication Instructions MAO:[id number]    Printed on:11/25/17 4969   Medication Information                      aspirin 81 MG EC tablet  Take 1 tablet by mouth Daily.             cholecalciferol (VITAMIN D3) 1000 units tablet  Take 1,000 Units by mouth Daily.             FLUoxetine (PROzac) 20 MG capsule  Take 20 mg by mouth Daily.             HYDROcodone-acetaminophen (NORCO) 7.5-325 MG per tablet  Take 1 tablet by mouth Every 4 (Four) Hours As Needed for Moderate Pain  for up to 30 doses.             levocetirizine (XYZAL) 5 MG tablet  Take 5 mg by mouth Every Evening.             metoprolol tartrate (LOPRESSOR) 25 MG tablet  Take 25 mg by mouth 2 (Two) Times a Day.             pantoprazole (PROTONIX) 40 MG EC tablet  Take 40 mg by mouth Daily.             pentosan polysulfate (ELMIRON) 100 MG capsule  Take 100 mg by mouth 2 (Two) Times a Day.             pravastatin (PRAVACHOL) 20 MG tablet  Take 20 mg by mouth Daily.             solifenacin (VESICARE) 10 MG tablet  Take 10 mg by mouth Daily.             temazepam (RESTORIL) 30 MG capsule  Take 30 mg by mouth At Night As Needed for Sleep.                 Discharge Diet: cardiac.     Activity at Discharge:   Soft collar on except when showers on a non-slip surface or is awake and in a recliner.       Follow-up Appointments   per his orders.    Discussed with patient and RN.    Michael Haney MD  11/25/17  9:25 AM

## 2017-11-25 NOTE — PROGRESS NOTES
Continued Stay Note  [facility]     Patient Name: Ирина Gutierrez  MRN: [id number]  Today's Date: 11/25/2017    Admit Date: 11/24/2017          Discharge Plan       11/25/17 0859    Case Management/Social Work Plan    Plan Home    Patient/Family In Agreement With Plan yes    Additional Comments Spoke with patient at bedside regarding CM consult for follow up care.  Patient louie discharge needs at this time.  Patient plans to discharge home today via car with family to transport.               Discharge Codes     None        Expected Discharge Date and Time     Expected Discharge Date Expected Discharge Time    Nov 25, 2017             Onelia Delgado RN

## 2017-11-25 NOTE — PLAN OF CARE
Problem: Fall Risk (Adult)  Goal: Absence of Falls  Outcome: Outcome(s) achieved Date Met:  11/25/17

## 2017-11-25 NOTE — DISCHARGE INSTRUCTIONS
Soft collar on except when showers on a non-slip surface or is awake and in a recliner.  OK to shower POD #3  Misc Nursing Order (Specify)  on 11/25/17

## 2019-10-08 ENCOUNTER — HOSPITAL ENCOUNTER (OUTPATIENT)
Dept: GENERAL RADIOLOGY | Facility: HOSPITAL | Age: 64
Discharge: HOME | End: 2019-10-08
Admitting: ORTHOPAEDIC SURGERY

## 2019-10-08 ENCOUNTER — APPOINTMENT (OUTPATIENT)
Dept: PREADMISSION TESTING | Facility: HOSPITAL | Age: 64
End: 2019-10-08

## 2019-10-08 VITALS — HEIGHT: 64 IN | BODY MASS INDEX: 33.61 KG/M2 | WEIGHT: 196.87 LBS

## 2019-10-08 LAB
ANION GAP SERPL CALCULATED.3IONS-SCNC: 10 MMOL/L (ref 5–15)
BACTERIA UR QL AUTO: NORMAL /HPF
BILIRUB UR QL STRIP: NEGATIVE
BUN BLD-MCNC: 11 MG/DL (ref 8–23)
BUN/CREAT SERPL: 17.7 (ref 7–25)
CALCIUM SPEC-SCNC: 9.4 MG/DL (ref 8.6–10.5)
CHLORIDE SERPL-SCNC: 104 MMOL/L (ref 98–107)
CLARITY UR: CLEAR
CO2 SERPL-SCNC: 27 MMOL/L (ref 22–29)
COLOR UR: YELLOW
CREAT BLD-MCNC: 0.62 MG/DL (ref 0.57–1)
DEPRECATED RDW RBC AUTO: 42.5 FL (ref 37–54)
ERYTHROCYTE [DISTWIDTH] IN BLOOD BY AUTOMATED COUNT: 12.2 % (ref 12.3–15.4)
GFR SERPL CREATININE-BSD FRML MDRD: 97 ML/MIN/1.73
GLUCOSE BLD-MCNC: 100 MG/DL (ref 65–99)
GLUCOSE UR STRIP-MCNC: NEGATIVE MG/DL
HBA1C MFR BLD: 5.1 % (ref 4.8–5.6)
HCT VFR BLD AUTO: 39.6 % (ref 34–46.6)
HGB BLD-MCNC: 12.6 G/DL (ref 12–15.9)
HGB UR QL STRIP.AUTO: NEGATIVE
HYALINE CASTS UR QL AUTO: NORMAL /LPF
KETONES UR QL STRIP: NEGATIVE
LEUKOCYTE ESTERASE UR QL STRIP.AUTO: NEGATIVE
MCH RBC QN AUTO: 30.1 PG (ref 26.6–33)
MCHC RBC AUTO-ENTMCNC: 31.8 G/DL (ref 31.5–35.7)
MCV RBC AUTO: 94.5 FL (ref 79–97)
NITRITE UR QL STRIP: NEGATIVE
PH UR STRIP.AUTO: 6.5 [PH] (ref 5–8)
PLATELET # BLD AUTO: 240 10*3/MM3 (ref 140–450)
PMV BLD AUTO: 10.1 FL (ref 6–12)
POTASSIUM BLD-SCNC: 4.4 MMOL/L (ref 3.5–5.2)
PROT UR QL STRIP: NEGATIVE
RBC # BLD AUTO: 4.19 10*6/MM3 (ref 3.77–5.28)
RBC # UR: NORMAL /HPF
REF LAB TEST METHOD: NORMAL
SODIUM BLD-SCNC: 141 MMOL/L (ref 136–145)
SP GR UR STRIP: 1.01 (ref 1–1.03)
SQUAMOUS #/AREA URNS HPF: NORMAL /HPF
UROBILINOGEN UR QL STRIP: NORMAL
WBC NRBC COR # BLD: 6.05 10*3/MM3 (ref 3.4–10.8)
WBC UR QL AUTO: NORMAL /HPF

## 2019-10-08 PROCEDURE — 71046 X-RAY EXAM CHEST 2 VIEWS: CPT

## 2019-10-08 PROCEDURE — 93005 ELECTROCARDIOGRAM TRACING: CPT

## 2019-10-08 PROCEDURE — 81001 URINALYSIS AUTO W/SCOPE: CPT | Performed by: ORTHOPAEDIC SURGERY

## 2019-10-08 PROCEDURE — 83036 HEMOGLOBIN GLYCOSYLATED A1C: CPT | Performed by: ORTHOPAEDIC SURGERY

## 2019-10-08 PROCEDURE — 80048 BASIC METABOLIC PNL TOTAL CA: CPT | Performed by: ORTHOPAEDIC SURGERY

## 2019-10-08 PROCEDURE — 93010 ELECTROCARDIOGRAM REPORT: CPT | Performed by: INTERNAL MEDICINE

## 2019-10-08 PROCEDURE — 36415 COLL VENOUS BLD VENIPUNCTURE: CPT

## 2019-10-08 PROCEDURE — 85027 COMPLETE CBC AUTOMATED: CPT | Performed by: ORTHOPAEDIC SURGERY

## 2019-10-08 RX ORDER — MIRABEGRON 25 MG/1
25 TABLET, FILM COATED, EXTENDED RELEASE ORAL DAILY
COMMUNITY
End: 2020-09-10

## 2019-10-08 RX ORDER — FESOTERODINE FUMARATE 8 MG/1
8 TABLET, EXTENDED RELEASE ORAL
COMMUNITY
End: 2020-09-10

## 2019-10-08 RX ORDER — DEXLANSOPRAZOLE 60 MG/1
60 CAPSULE, DELAYED RELEASE ORAL DAILY
COMMUNITY

## 2019-10-08 ASSESSMENT — KOOS JR
KOOS JR SCORE: 50.012
KOOS JR SCORE: 15

## 2019-10-08 NOTE — PAT
Patient to apply Chlorhexadine wipes  to surgical area (as instructed) the night before procedure and the AM of procedure. Wipes provided.    Patient instructed to drink 20 ounces (or until full) of Gatorade and it needs to be completed 1 hour before given arrival time for procedure (NO RED Gatorade)    Patient verbalized understanding.    Patient attended joint replacement class today during PAT visit.

## 2019-10-16 ENCOUNTER — ANESTHESIA EVENT (OUTPATIENT)
Dept: PERIOP | Facility: HOSPITAL | Age: 64
End: 2019-10-16

## 2019-10-17 ENCOUNTER — ANESTHESIA (OUTPATIENT)
Dept: PERIOP | Facility: HOSPITAL | Age: 64
End: 2019-10-17

## 2019-10-17 ENCOUNTER — APPOINTMENT (OUTPATIENT)
Dept: GENERAL RADIOLOGY | Facility: HOSPITAL | Age: 64
End: 2019-10-17

## 2019-10-17 ENCOUNTER — HOSPITAL ENCOUNTER (OUTPATIENT)
Facility: HOSPITAL | Age: 64
Discharge: HOME HEALTH | End: 2019-10-18
Attending: ORTHOPAEDIC SURGERY | Admitting: ORTHOPAEDIC SURGERY

## 2019-10-17 DIAGNOSIS — Z74.09 IMPAIRED MOBILITY AND ADLS: ICD-10-CM

## 2019-10-17 DIAGNOSIS — Z78.9 IMPAIRED MOBILITY AND ADLS: ICD-10-CM

## 2019-10-17 DIAGNOSIS — Z96.652 STATUS POST TOTAL LEFT KNEE REPLACEMENT: Primary | ICD-10-CM

## 2019-10-17 PROBLEM — I10 HTN (HYPERTENSION): Status: ACTIVE | Noted: 2019-10-17

## 2019-10-17 PROBLEM — M17.12 PRIMARY OSTEOARTHRITIS OF LEFT KNEE: Status: ACTIVE | Noted: 2019-10-17

## 2019-10-17 PROCEDURE — 73560 X-RAY EXAM OF KNEE 1 OR 2: CPT

## 2019-10-17 PROCEDURE — 25010000002 ROPIVACAINE PER 1 MG: Performed by: NURSE ANESTHETIST, CERTIFIED REGISTERED

## 2019-10-17 PROCEDURE — 97116 GAIT TRAINING THERAPY: CPT | Performed by: PHYSICAL THERAPIST

## 2019-10-17 PROCEDURE — 25010000002 ROPIVACAINE PER 1 MG: Performed by: ORTHOPAEDIC SURGERY

## 2019-10-17 PROCEDURE — 25010000002 ONDANSETRON PER 1 MG: Performed by: NURSE ANESTHETIST, CERTIFIED REGISTERED

## 2019-10-17 PROCEDURE — 25010000002 PROPOFOL 10 MG/ML EMULSION: Performed by: NURSE ANESTHETIST, CERTIFIED REGISTERED

## 2019-10-17 PROCEDURE — 25010000002 DEXAMETHASONE PER 1 MG: Performed by: NURSE ANESTHETIST, CERTIFIED REGISTERED

## 2019-10-17 PROCEDURE — 25010000002 HYDROMORPHONE PER 4 MG: Performed by: NURSE ANESTHETIST, CERTIFIED REGISTERED

## 2019-10-17 PROCEDURE — C1713 ANCHOR/SCREW BN/BN,TIS/BN: HCPCS | Performed by: ORTHOPAEDIC SURGERY

## 2019-10-17 PROCEDURE — 94799 UNLISTED PULMONARY SVC/PX: CPT

## 2019-10-17 PROCEDURE — 25010000003 MORPHINE PER 10 MG: Performed by: ORTHOPAEDIC SURGERY

## 2019-10-17 PROCEDURE — 97162 PT EVAL MOD COMPLEX 30 MIN: CPT | Performed by: PHYSICAL THERAPIST

## 2019-10-17 PROCEDURE — C1776 JOINT DEVICE (IMPLANTABLE): HCPCS | Performed by: ORTHOPAEDIC SURGERY

## 2019-10-17 DEVICE — LEGION NARROW POSTERIOR STABILIZED                                    OXINIUM SIZE 4N LEFT
Type: IMPLANTABLE DEVICE | Site: KNEE | Status: FUNCTIONAL
Brand: LEGION

## 2019-10-17 DEVICE — CMT BONE PALACOS R HI/VISC 1X40: Type: IMPLANTABLE DEVICE | Site: KNEE | Status: FUNCTIONAL

## 2019-10-17 DEVICE — LEGION PS HIGH FLEX XLPE SZ 1-2 9MM
Type: IMPLANTABLE DEVICE | Site: KNEE | Status: FUNCTIONAL
Brand: LEGION

## 2019-10-17 DEVICE — GEN II 7.5MM RESUR PAT 29MM
Type: IMPLANTABLE DEVICE | Site: KNEE | Status: FUNCTIONAL
Brand: GENESIS II

## 2019-10-17 DEVICE — IMPLANTABLE DEVICE: Type: IMPLANTABLE DEVICE | Site: KNEE | Status: FUNCTIONAL

## 2019-10-17 DEVICE — GENESIS II NON-POROUS TIBIAL                                    BASEPLATE SIZE 2 LEFT
Type: IMPLANTABLE DEVICE | Site: KNEE | Status: FUNCTIONAL
Brand: GENESIS II

## 2019-10-17 RX ORDER — CLINDAMYCIN PHOSPHATE 900 MG/50ML
900 INJECTION INTRAVENOUS ONCE
Status: COMPLETED | OUTPATIENT
Start: 2019-10-17 | End: 2019-10-17

## 2019-10-17 RX ORDER — SODIUM CHLORIDE 0.9 % (FLUSH) 0.9 %
3-10 SYRINGE (ML) INJECTION AS NEEDED
Status: DISCONTINUED | OUTPATIENT
Start: 2019-10-17 | End: 2019-10-17 | Stop reason: HOSPADM

## 2019-10-17 RX ORDER — MEPERIDINE HYDROCHLORIDE 25 MG/ML
12.5 INJECTION INTRAMUSCULAR; INTRAVENOUS; SUBCUTANEOUS
Status: DISCONTINUED | OUTPATIENT
Start: 2019-10-17 | End: 2019-10-17 | Stop reason: HOSPADM

## 2019-10-17 RX ORDER — BISACODYL 5 MG/1
10 TABLET, DELAYED RELEASE ORAL DAILY PRN
Status: DISCONTINUED | OUTPATIENT
Start: 2019-10-17 | End: 2019-10-18 | Stop reason: HOSPADM

## 2019-10-17 RX ORDER — MAGNESIUM HYDROXIDE 1200 MG/15ML
LIQUID ORAL AS NEEDED
Status: DISCONTINUED | OUTPATIENT
Start: 2019-10-17 | End: 2019-10-17 | Stop reason: HOSPADM

## 2019-10-17 RX ORDER — PROMETHAZINE HYDROCHLORIDE 25 MG/1
25 TABLET ORAL ONCE AS NEEDED
Status: DISCONTINUED | OUTPATIENT
Start: 2019-10-17 | End: 2019-10-17 | Stop reason: HOSPADM

## 2019-10-17 RX ORDER — HYDROCODONE BITARTRATE AND ACETAMINOPHEN 7.5; 325 MG/1; MG/1
1 TABLET ORAL EVERY 4 HOURS PRN
Status: DISCONTINUED | OUTPATIENT
Start: 2019-10-17 | End: 2019-10-18 | Stop reason: HOSPADM

## 2019-10-17 RX ORDER — DOCUSATE SODIUM 100 MG/1
100 CAPSULE, LIQUID FILLED ORAL 2 TIMES DAILY PRN
Status: DISCONTINUED | OUTPATIENT
Start: 2019-10-17 | End: 2019-10-18 | Stop reason: HOSPADM

## 2019-10-17 RX ORDER — ONDANSETRON 2 MG/ML
4 INJECTION INTRAMUSCULAR; INTRAVENOUS ONCE AS NEEDED
Status: DISCONTINUED | OUTPATIENT
Start: 2019-10-17 | End: 2019-10-17 | Stop reason: HOSPADM

## 2019-10-17 RX ORDER — PROMETHAZINE HYDROCHLORIDE 25 MG/ML
6.25 INJECTION, SOLUTION INTRAMUSCULAR; INTRAVENOUS ONCE AS NEEDED
Status: DISCONTINUED | OUTPATIENT
Start: 2019-10-17 | End: 2019-10-17 | Stop reason: HOSPADM

## 2019-10-17 RX ORDER — ONDANSETRON 4 MG/1
4 TABLET, FILM COATED ORAL EVERY 6 HOURS PRN
Status: DISCONTINUED | OUTPATIENT
Start: 2019-10-17 | End: 2019-10-18 | Stop reason: HOSPADM

## 2019-10-17 RX ORDER — DIPHENHYDRAMINE HCL 25 MG
25 CAPSULE ORAL EVERY 6 HOURS PRN
Status: DISCONTINUED | OUTPATIENT
Start: 2019-10-17 | End: 2019-10-18 | Stop reason: HOSPADM

## 2019-10-17 RX ORDER — DEXAMETHASONE SODIUM PHOSPHATE 4 MG/ML
INJECTION, SOLUTION INTRA-ARTICULAR; INTRALESIONAL; INTRAMUSCULAR; INTRAVENOUS; SOFT TISSUE AS NEEDED
Status: DISCONTINUED | OUTPATIENT
Start: 2019-10-17 | End: 2019-10-17 | Stop reason: SURG

## 2019-10-17 RX ORDER — ONDANSETRON 2 MG/ML
4 INJECTION INTRAMUSCULAR; INTRAVENOUS EVERY 6 HOURS PRN
Status: DISCONTINUED | OUTPATIENT
Start: 2019-10-17 | End: 2019-10-18 | Stop reason: HOSPADM

## 2019-10-17 RX ORDER — ONDANSETRON 2 MG/ML
INJECTION INTRAMUSCULAR; INTRAVENOUS AS NEEDED
Status: DISCONTINUED | OUTPATIENT
Start: 2019-10-17 | End: 2019-10-17 | Stop reason: SURG

## 2019-10-17 RX ORDER — OXYBUTYNIN CHLORIDE 5 MG/1
10 TABLET, EXTENDED RELEASE ORAL DAILY
Status: DISCONTINUED | OUTPATIENT
Start: 2019-10-17 | End: 2019-10-18 | Stop reason: HOSPADM

## 2019-10-17 RX ORDER — MELOXICAM 7.5 MG/1
15 TABLET ORAL DAILY
Status: DISCONTINUED | OUTPATIENT
Start: 2019-10-17 | End: 2019-10-18 | Stop reason: HOSPADM

## 2019-10-17 RX ORDER — BUPIVACAINE HYDROCHLORIDE 5 MG/ML
INJECTION, SOLUTION PERINEURAL
Status: COMPLETED | OUTPATIENT
Start: 2019-10-17 | End: 2019-10-17

## 2019-10-17 RX ORDER — ACETAMINOPHEN 650 MG/1
650 SUPPOSITORY RECTAL EVERY 4 HOURS PRN
Status: DISCONTINUED | OUTPATIENT
Start: 2019-10-17 | End: 2019-10-18 | Stop reason: HOSPADM

## 2019-10-17 RX ORDER — DIPHENHYDRAMINE HYDROCHLORIDE 50 MG/ML
25 INJECTION INTRAMUSCULAR; INTRAVENOUS EVERY 6 HOURS PRN
Status: DISCONTINUED | OUTPATIENT
Start: 2019-10-17 | End: 2019-10-18 | Stop reason: HOSPADM

## 2019-10-17 RX ORDER — SODIUM CHLORIDE, SODIUM LACTATE, POTASSIUM CHLORIDE, CALCIUM CHLORIDE 600; 310; 30; 20 MG/100ML; MG/100ML; MG/100ML; MG/100ML
9 INJECTION, SOLUTION INTRAVENOUS CONTINUOUS PRN
Status: DISCONTINUED | OUTPATIENT
Start: 2019-10-17 | End: 2019-10-18 | Stop reason: HOSPADM

## 2019-10-17 RX ORDER — LIDOCAINE HYDROCHLORIDE 10 MG/ML
0.5 INJECTION, SOLUTION EPIDURAL; INFILTRATION; INTRACAUDAL; PERINEURAL ONCE AS NEEDED
Status: COMPLETED | OUTPATIENT
Start: 2019-10-17 | End: 2019-10-17

## 2019-10-17 RX ORDER — ASPIRIN 325 MG
325 TABLET, DELAYED RELEASE (ENTERIC COATED) ORAL DAILY
Status: DISCONTINUED | OUTPATIENT
Start: 2019-10-18 | End: 2019-10-18 | Stop reason: HOSPADM

## 2019-10-17 RX ORDER — ACETAMINOPHEN 325 MG/1
650 TABLET ORAL EVERY 4 HOURS PRN
Status: DISCONTINUED | OUTPATIENT
Start: 2019-10-17 | End: 2019-10-18 | Stop reason: HOSPADM

## 2019-10-17 RX ORDER — ACETAMINOPHEN 500 MG
1000 TABLET ORAL ONCE
Status: COMPLETED | OUTPATIENT
Start: 2019-10-17 | End: 2019-10-17

## 2019-10-17 RX ORDER — PROMETHAZINE HYDROCHLORIDE 25 MG/1
25 SUPPOSITORY RECTAL ONCE AS NEEDED
Status: DISCONTINUED | OUTPATIENT
Start: 2019-10-17 | End: 2019-10-17 | Stop reason: HOSPADM

## 2019-10-17 RX ORDER — LABETALOL HYDROCHLORIDE 5 MG/ML
10 INJECTION, SOLUTION INTRAVENOUS EVERY 4 HOURS PRN
Status: DISCONTINUED | OUTPATIENT
Start: 2019-10-17 | End: 2019-10-18 | Stop reason: HOSPADM

## 2019-10-17 RX ORDER — BISACODYL 10 MG
10 SUPPOSITORY, RECTAL RECTAL DAILY PRN
Status: DISCONTINUED | OUTPATIENT
Start: 2019-10-17 | End: 2019-10-18 | Stop reason: HOSPADM

## 2019-10-17 RX ORDER — SODIUM CHLORIDE 9 MG/ML
100 INJECTION, SOLUTION INTRAVENOUS CONTINUOUS
Status: DISCONTINUED | OUTPATIENT
Start: 2019-10-17 | End: 2019-10-18 | Stop reason: HOSPADM

## 2019-10-17 RX ORDER — FLUOXETINE HYDROCHLORIDE 20 MG/1
20 CAPSULE ORAL DAILY
Status: DISCONTINUED | OUTPATIENT
Start: 2019-10-17 | End: 2019-10-18 | Stop reason: HOSPADM

## 2019-10-17 RX ORDER — FENTANYL CITRATE 50 UG/ML
50 INJECTION, SOLUTION INTRAMUSCULAR; INTRAVENOUS
Status: DISCONTINUED | OUTPATIENT
Start: 2019-10-17 | End: 2019-10-17 | Stop reason: HOSPADM

## 2019-10-17 RX ORDER — HYDRALAZINE HYDROCHLORIDE 20 MG/ML
5 INJECTION INTRAMUSCULAR; INTRAVENOUS
Status: DISCONTINUED | OUTPATIENT
Start: 2019-10-17 | End: 2019-10-17 | Stop reason: HOSPADM

## 2019-10-17 RX ORDER — NALOXONE HCL 0.4 MG/ML
0.1 VIAL (ML) INJECTION
Status: DISCONTINUED | OUTPATIENT
Start: 2019-10-17 | End: 2019-10-18 | Stop reason: HOSPADM

## 2019-10-17 RX ORDER — HYDROMORPHONE HYDROCHLORIDE 1 MG/ML
0.5 INJECTION, SOLUTION INTRAMUSCULAR; INTRAVENOUS; SUBCUTANEOUS
Status: DISCONTINUED | OUTPATIENT
Start: 2019-10-17 | End: 2019-10-18 | Stop reason: HOSPADM

## 2019-10-17 RX ORDER — HYDROMORPHONE HYDROCHLORIDE 1 MG/ML
0.5 INJECTION, SOLUTION INTRAMUSCULAR; INTRAVENOUS; SUBCUTANEOUS
Status: DISCONTINUED | OUTPATIENT
Start: 2019-10-17 | End: 2019-10-17 | Stop reason: HOSPADM

## 2019-10-17 RX ORDER — CLINDAMYCIN PHOSPHATE 900 MG/50ML
900 INJECTION INTRAVENOUS EVERY 8 HOURS
Status: COMPLETED | OUTPATIENT
Start: 2019-10-17 | End: 2019-10-18

## 2019-10-17 RX ORDER — ONDANSETRON 2 MG/ML
4 INJECTION INTRAMUSCULAR; INTRAVENOUS EVERY 6 HOURS PRN
Status: DISCONTINUED | OUTPATIENT
Start: 2019-10-17 | End: 2019-10-17 | Stop reason: SDUPTHER

## 2019-10-17 RX ORDER — FAMOTIDINE 20 MG/1
20 TABLET, FILM COATED ORAL
Status: DISCONTINUED | OUTPATIENT
Start: 2019-10-17 | End: 2019-10-17 | Stop reason: HOSPADM

## 2019-10-17 RX ORDER — SODIUM CHLORIDE 0.9 % (FLUSH) 0.9 %
3 SYRINGE (ML) INJECTION EVERY 12 HOURS SCHEDULED
Status: DISCONTINUED | OUTPATIENT
Start: 2019-10-17 | End: 2019-10-17 | Stop reason: HOSPADM

## 2019-10-17 RX ORDER — BUPIVACAINE HYDROCHLORIDE 2.5 MG/ML
INJECTION, SOLUTION EPIDURAL; INFILTRATION; INTRACAUDAL
Status: COMPLETED | OUTPATIENT
Start: 2019-10-17 | End: 2019-10-17

## 2019-10-17 RX ORDER — TEMAZEPAM 15 MG/1
30 CAPSULE ORAL NIGHTLY PRN
Status: DISCONTINUED | OUTPATIENT
Start: 2019-10-17 | End: 2019-10-18 | Stop reason: HOSPADM

## 2019-10-17 RX ORDER — PANTOPRAZOLE SODIUM 40 MG/1
40 TABLET, DELAYED RELEASE ORAL
Status: DISCONTINUED | OUTPATIENT
Start: 2019-10-17 | End: 2019-10-18 | Stop reason: HOSPADM

## 2019-10-17 RX ORDER — LABETALOL HYDROCHLORIDE 5 MG/ML
5 INJECTION, SOLUTION INTRAVENOUS
Status: DISCONTINUED | OUTPATIENT
Start: 2019-10-17 | End: 2019-10-17 | Stop reason: HOSPADM

## 2019-10-17 RX ORDER — SENNA AND DOCUSATE SODIUM 50; 8.6 MG/1; MG/1
2 TABLET, FILM COATED ORAL 2 TIMES DAILY PRN
Status: DISCONTINUED | OUTPATIENT
Start: 2019-10-17 | End: 2019-10-18 | Stop reason: HOSPADM

## 2019-10-17 RX ORDER — HYDROCODONE BITARTRATE AND ACETAMINOPHEN 5; 325 MG/1; MG/1
1 TABLET ORAL ONCE AS NEEDED
Status: DISCONTINUED | OUTPATIENT
Start: 2019-10-17 | End: 2019-10-17 | Stop reason: HOSPADM

## 2019-10-17 RX ORDER — PREGABALIN 75 MG/1
75 CAPSULE ORAL ONCE
Status: COMPLETED | OUTPATIENT
Start: 2019-10-17 | End: 2019-10-17

## 2019-10-17 RX ORDER — L.ACID,PARA/B.BIFIDUM/S.THERM 8B CELL
1 CAPSULE ORAL DAILY
Status: DISCONTINUED | OUTPATIENT
Start: 2019-10-17 | End: 2019-10-18 | Stop reason: HOSPADM

## 2019-10-17 RX ORDER — PRAVASTATIN SODIUM 20 MG
20 TABLET ORAL NIGHTLY
Status: DISCONTINUED | OUTPATIENT
Start: 2019-10-17 | End: 2019-10-18 | Stop reason: HOSPADM

## 2019-10-17 RX ORDER — HYDROCODONE BITARTRATE AND ACETAMINOPHEN 7.5; 325 MG/1; MG/1
2 TABLET ORAL EVERY 4 HOURS PRN
Status: DISCONTINUED | OUTPATIENT
Start: 2019-10-17 | End: 2019-10-18 | Stop reason: HOSPADM

## 2019-10-17 RX ORDER — POLYETHYLENE GLYCOL 3350 17 G/17G
17 POWDER, FOR SOLUTION ORAL DAILY
Status: DISCONTINUED | OUTPATIENT
Start: 2019-10-17 | End: 2019-10-18 | Stop reason: HOSPADM

## 2019-10-17 RX ORDER — ACETAMINOPHEN 160 MG/5ML
650 SOLUTION ORAL EVERY 4 HOURS PRN
Status: DISCONTINUED | OUTPATIENT
Start: 2019-10-17 | End: 2019-10-18 | Stop reason: HOSPADM

## 2019-10-17 RX ORDER — NALOXONE HCL 0.4 MG/ML
0.4 VIAL (ML) INJECTION AS NEEDED
Status: DISCONTINUED | OUTPATIENT
Start: 2019-10-17 | End: 2019-10-17 | Stop reason: HOSPADM

## 2019-10-17 RX ORDER — IPRATROPIUM BROMIDE AND ALBUTEROL SULFATE 2.5; .5 MG/3ML; MG/3ML
3 SOLUTION RESPIRATORY (INHALATION) ONCE AS NEEDED
Status: DISCONTINUED | OUTPATIENT
Start: 2019-10-17 | End: 2019-10-17 | Stop reason: HOSPADM

## 2019-10-17 RX ADMIN — HYDROCODONE BITARTRATE AND ACETAMINOPHEN 1 TABLET: 7.5; 325 TABLET ORAL at 20:15

## 2019-10-17 RX ADMIN — METOPROLOL TARTRATE 25 MG: 25 TABLET ORAL at 20:15

## 2019-10-17 RX ADMIN — PREGABALIN 75 MG: 75 CAPSULE ORAL at 08:15

## 2019-10-17 RX ADMIN — SODIUM CHLORIDE, POTASSIUM CHLORIDE, SODIUM LACTATE AND CALCIUM CHLORIDE 9 ML/HR: 600; 310; 30; 20 INJECTION, SOLUTION INTRAVENOUS at 08:15

## 2019-10-17 RX ADMIN — SODIUM CHLORIDE 100 ML/HR: 9 INJECTION, SOLUTION INTRAVENOUS at 15:45

## 2019-10-17 RX ADMIN — FAMOTIDINE 20 MG: 20 TABLET ORAL at 08:15

## 2019-10-17 RX ADMIN — HYDROMORPHONE HYDROCHLORIDE 0.5 MG: 1 INJECTION, SOLUTION INTRAMUSCULAR; INTRAVENOUS; SUBCUTANEOUS at 15:00

## 2019-10-17 RX ADMIN — PROPOFOL 75 MCG/KG/MIN: 10 INJECTION, EMULSION INTRAVENOUS at 09:42

## 2019-10-17 RX ADMIN — ONDANSETRON 4 MG: 2 INJECTION INTRAMUSCULAR; INTRAVENOUS at 10:45

## 2019-10-17 RX ADMIN — BUPIVACAINE HYDROCHLORIDE 20 ML: 2.5 INJECTION, SOLUTION EPIDURAL; INFILTRATION; INTRACAUDAL; PERINEURAL at 11:06

## 2019-10-17 RX ADMIN — PANTOPRAZOLE SODIUM 40 MG: 40 TABLET, DELAYED RELEASE ORAL at 17:51

## 2019-10-17 RX ADMIN — BUPIVACAINE HYDROCHLORIDE 1.8 ML: 5 INJECTION, SOLUTION PERINEURAL at 09:48

## 2019-10-17 RX ADMIN — TRANEXAMIC ACID 1000 MG: 100 INJECTION, SOLUTION INTRAVENOUS at 09:50

## 2019-10-17 RX ADMIN — ROPIVACAINE HYDROCHLORIDE 10 ML/HR: 5 INJECTION, SOLUTION EPIDURAL; INFILTRATION; PERINEURAL at 11:30

## 2019-10-17 RX ADMIN — LIDOCAINE HYDROCHLORIDE 0.5 ML: 10 INJECTION, SOLUTION EPIDURAL; INFILTRATION; INTRACAUDAL; PERINEURAL at 08:15

## 2019-10-17 RX ADMIN — CLINDAMYCIN PHOSPHATE 900 MG: 900 INJECTION, SOLUTION INTRAVENOUS at 17:48

## 2019-10-17 RX ADMIN — HYDROCODONE BITARTRATE AND ACETAMINOPHEN 2 TABLET: 7.5; 325 TABLET ORAL at 15:55

## 2019-10-17 RX ADMIN — MELOXICAM 15 MG: 7.5 TABLET ORAL at 17:49

## 2019-10-17 RX ADMIN — PRAVASTATIN SODIUM 20 MG: 20 TABLET ORAL at 20:15

## 2019-10-17 RX ADMIN — DEXAMETHASONE SODIUM PHOSPHATE 8 MG: 4 INJECTION, SOLUTION INTRAMUSCULAR; INTRAVENOUS at 10:45

## 2019-10-17 RX ADMIN — ACETAMINOPHEN 1000 MG: 500 TABLET ORAL at 08:15

## 2019-10-17 RX ADMIN — HYDROMORPHONE HYDROCHLORIDE 0.5 MG: 1 INJECTION, SOLUTION INTRAMUSCULAR; INTRAVENOUS; SUBCUTANEOUS at 13:45

## 2019-10-17 RX ADMIN — FLUOXETINE HYDROCHLORIDE 20 MG: 20 CAPSULE ORAL at 17:50

## 2019-10-17 RX ADMIN — TRANEXAMIC ACID 1000 MG: 100 INJECTION, SOLUTION INTRAVENOUS at 10:48

## 2019-10-17 RX ADMIN — POLYETHYLENE GLYCOL 3350 17 G: 17 POWDER, FOR SOLUTION ORAL at 17:48

## 2019-10-17 RX ADMIN — PENTOSAN POLYSULFATE SODIUM 100 MG: 100 CAPSULE, GELATIN COATED ORAL at 17:50

## 2019-10-17 RX ADMIN — Medication 1 CAPSULE: at 17:49

## 2019-10-17 RX ADMIN — CLINDAMYCIN PHOSPHATE 900 MG: 18 INJECTION, SOLUTION INTRAVENOUS at 09:39

## 2019-10-17 NOTE — ANESTHESIA PROCEDURE NOTES
Peripheral Block      Patient reassessed immediately prior to procedure    Patient location during procedure: post-op  Start time: 10/17/2019 11:20 AM  Reason for block: at surgeon's request and post-op pain management  Performed by  CRNA: Kofi Thomas CRNA  Assisted by: Linda Mcmullen RN  Preanesthetic Checklist  Completed: patient identified, site marked, surgical consent, pre-op evaluation, timeout performed, IV checked, risks and benefits discussed and monitors and equipment checked  Prep:  Pt Position: supine  Sterile barriers:cap, gloves, mask and sterile barriers  Prep: ChloraPrep  Patient monitoring: blood pressure monitoring, continuous pulse oximetry and EKG  Procedure  Sedation:no  Performed under: spinal  Guidance:ultrasound guided  Images:still images obtained, printed/placed on chart    Laterality:left  Block Type:adductor canal block  Injection Technique:catheter  Needle Type:Tuohy and echogenic  Needle Gauge:18 G  Resistance on Injection: none  Catheter Size:20 G (20g)  Cath Depth at skin: 12 cm    Medications Used: bupivacaine PF (MARCAINE) 0.25 % injection, 20 mL      Medications  Preservative Free Saline:5ml    Post Assessment  Injection Assessment: negative aspiration for heme, incremental injection and no paresthesia on injection  Patient Tolerance:comfortable throughout block  Complications:no  Additional Notes  Procedure:             The pt was placed in the Supine position.  The Insertion site was  prepped and Draped in sterile fashion.  The pt was anesthetized with  IV Sedation( see meds).  Skin and cutaneous tissue was infiltrated and anesthetized with 1% Lidocaine 3 mls via a 25g needle.  A BBraun 4 inch 18g echogenic needle was then  inserted approximately midline, mid-thigh and advanced In-plane with Ultrasound guidance.  Normal Saline PSF was utilized for hydrodissection of tissue.  The Vastus medialis and Sartorius muscle where visualized and the needle tip was placed in the  adductor canal,  lateral to the femoral artery.  LA injection spread was visualized, injection was incremental 1-5ml, injection pressure was normal or little, no intraneural injection, no vascular injection.  LA dose was injected thru the needle(see dose above).  A BBraun 20g wire stylet catheter was placed via the needle with ultrasound visualization and confirmation with NS fluid bolus. The labeled Catheter was then secured to skin at insertion site with skin afix and steristrips to curled catheter and CHG transparent dressing.  Thank you.

## 2019-10-17 NOTE — ANESTHESIA POSTPROCEDURE EVALUATION
Patient: Ирина Gutierrez    Procedure Summary     Date:  10/17/19 [facility]    Anesthesia Start:  0942 Anesthesia Stop:  1134    Procedure:  LEFT TOTAL KNEE REPLACEMENT (Left Knee) Diagnosis:       Primary osteoarthritis of left knee      (left knee osteoarthritis)    Surgeon:  Grant Diez MD Provider:  Jesus Cintron MD    Anesthesia Type:  spinal ASA Status:  3          Anesthesia Type: spinal  Last vitals  BP   112/62 (10/17/19 1115)   Temp   97.5 °F (36.4 °C) (10/17/19 1115)   Pulse   62 (10/17/19 1115)   Resp   16 (10/17/19 1115)     SpO2   97 % (10/17/19 1115)     Post Anesthesia Care and Evaluation    Patient location during evaluation: PACU  Patient participation: complete - patient participated  Level of consciousness: awake and alert  Pain score: 0  Pain management: adequate  Airway patency: patent  Anesthetic complications: No anesthetic complications  PONV Status: none  Cardiovascular status: hemodynamically stable and acceptable  Respiratory status: nonlabored ventilation, acceptable and nasal cannula  Hydration status: acceptable  Post Neuraxial Block status: No signs or symptoms of PDPH

## 2019-10-17 NOTE — PLAN OF CARE
Problem: Patient Care Overview  Goal: Plan of Care Review  Outcome: Ongoing (interventions implemented as appropriate)   10/17/19 7762   Coping/Psychosocial   Plan of Care Reviewed With patient;spouse   Plan of Care Review   Progress improving   OTHER   Outcome Summary Pt amb 16' with RW CGA x 2 with VC's to stay within walker and sequencing on steps with walker. Pt had slight buckling of L LE. Pt was limited by nausea, pain, and fatigue. Pt is min assist x 1 for supine to sit. Encouraged patient to ambulate with nursing another time tonight. Recommend home with assist and home health at discharge. PADD: 8

## 2019-10-17 NOTE — BRIEF OP NOTE
TOTAL KNEE ARTHROPLASTY  Progress Note    Ирина Parmarl  10/17/2019    Pre-op Diagnosis:   left knee osteoarthritis       Post-Op Diagnosis Codes:     * Primary osteoarthritis of left knee [M17.12]    Procedure/CPT® Codes:  MD TOTAL KNEE ARTHROPLASTY [36623]    Procedure(s):  LEFT TOTAL KNEE REPLACEMENT    Surgeon(s):  Grant Diez MD    Assistant: GILLIAN Uribe    Anesthesia: Spinal, local and adductor canal catheter    Staff:   Circulator: Wilda Sexton RN; Ac Campbell RN  Scrub Person: Ирина Mcguire  Vendor Representative: Tre Leiva  Assistant: Arun Toledo RNFA    Estimated Blood Loss: minimal    Urine Voided: * No values recorded between 10/17/2019  9:39 AM and 10/17/2019 11:12 AM *    Specimens:                None          Drains: None    Findings: Left knee moderate tricompartmental degenerative changes    Complications: None    Implants: [employer] posterior stabilized total knee arthroplasty with a size 4 femur, 2 tibia, 9 polyethylene and 29 thin patella    Tourniquet time: 58 minutes at 300 mmHg      Grant Diez MD     Date: 10/17/2019  Time: 11:31 AM

## 2019-10-17 NOTE — H&P
Patient Name: Ирина Gutierrez  MRN: [id number]  : 1955  DOS: 10/17/2019    Attending: Grant Diez,*    Primary Care Provider: Preston Grant MD      Chief complaint:  Left knee pain    Subjective   Patient is a 64 y.o. female presented for left total knee arthroplasty by Dr. Diez.    She underwent surgery under spinal anesthesia. She tolerated surgery well and is admitted for further medical management.     She is known to us from previous admission for ACDF in 2017; which she recovered well.    When seen in PACU she is doing well. Her pain is well controlled. She denies nausea, shortness of breath or chest pain. No hx of DVT or PE.    She has hx of cdiff last , HTN, HLD.    (Above is noted, agree.  Doing very well when I saw her in room afterwards.  Good pain control.  No nausea vomiting or shortness of breath.  Walked a short distance with PT 16 feet with a rolling walker and contact-guard assist of 2.  No nausea.  No vomiting.  No shortness of breath.  Currently resting in bed.)wy    Allergies:  Allergies   Allergen Reactions   • Augmentin [Amoxicillin-Pot Clavulanate] Nausea Only   • Bactrim [Sulfamethoxazole-Trimethoprim] Rash       Meds:  Medications Prior to Admission   Medication Sig Dispense Refill Last Dose   • dexlansoprazole (DEXILANT) 60 MG capsule Take 60 mg by mouth Daily.   10/16/2019   • fesoterodine fumarate (TOVIAZ ER) 8 MG tablet sustained-release 24 hour tablet Take 8 mg by mouth Daily.   10/16/2019   • FLUoxetine (PROzac) 20 MG capsule Take 20 mg by mouth Daily.   10/16/2019   • HYDROcodone-acetaminophen (NORCO) 7.5-325 MG per tablet Take 1 tablet by mouth Every 4 (Four) Hours As Needed for Moderate Pain  for up to 30 doses. (Patient taking differently: Take 1 tablet by mouth Every 6 (Six) Hours As Needed for Moderate Pain .) 30 tablet 0 10/16/2019   • levocetirizine (XYZAL) 5 MG tablet Take 5 mg by mouth Every Evening.   10/16/2019   • metoprolol tartrate  (LOPRESSOR) 25 MG tablet Take 25 mg by mouth 2 (Two) Times a Day.   10/17/2019 at 0430   • Mirabegron ER (MYRBETRIQ) 25 MG tablet sustained-release 24 hour 24 hr tablet Take 25 mg by mouth Daily.   10/16/2019   • pentosan polysulfate (ELMIRON) 100 MG capsule Take 100 mg by mouth 2 (Two) Times a Day.   10/16/2019   • Polyethylene Glycol 3350 (MIRALAX PO) Take 1 dose by mouth Daily.   10/16/2019   • pravastatin (PRAVACHOL) 20 MG tablet Take 20 mg by mouth Daily.   10/16/2019   • temazepam (RESTORIL) 30 MG capsule Take 30 mg by mouth At Night As Needed for Sleep.   10/16/2019   • aspirin 81 MG EC tablet Take 1 tablet by mouth Daily.   10/10/2019   • NON FORMULARY 1 each Daily. CBD gel cap, 25mg daily   10/10/2019       History:   Past Medical History:   Diagnosis Date   • Arthritis    • Clostridium difficile infection 2018   • Depression    • GERD (gastroesophageal reflux disease)    • High cholesterol    • Hypertension    • Interstitial cystitis    • LBBB (left bundle branch block)    • Neck pain    • OAB (overactive bladder)    • Squamous cell carcinoma in situ     REMOVED FROM BOTH SHOULDERS, LUE, RLE, AND NOSE    • TIA (transient ischemic attack)     NO RESIDUAL PROBLEMS PER ANXIETY   • Wears reading eyeglasses      Past Surgical History:   Procedure Laterality Date   • ANTERIOR CERVICAL DISCECTOMY W/ FUSION N/A 2017    Procedure: CERVICAL DISCECTOMY ANTERIOR WITH FUSION C6-7 CAGE PLATE ALLOGRAFT;  Surgeon: Sanket Murrell MD;  Location: [facility];  Service:    • BLADDER SUSPENSION     • CATARACT EXTRACTION     • COLONOSCOPY     • ENDOSCOPY     • FINGER SURGERY Right     THIRD AND FOURTH FINGER RIGHT HAND    • HYSTERECTOMY  1986     History reviewed. No pertinent family history.  Social History     Tobacco Use   • Smoking status: Former Smoker     Packs/day: 0.50     Years: 30.00     Pack years: 15.00     Types: Cigarettes     Last attempt to quit: 2015     Years since quittin.7   •  Smokeless tobacco: Never Used   Substance Use Topics   • Alcohol use: No   • Drug use: No   She is  with no children. She is retired from office work.    Review of Systems  Pertinent items are noted in HPI, all other systems reviewed and negative    Vital Signs  Visit Vitals  /76   Pulse 71   Temp 98.2 °F (36.8 °C) (Temporal)   Resp 16   SpO2 97%       Physical Exam:    General Appearance:    Alert, cooperative, in no acute distress   Head:    Normocephalic, without obvious abnormality, atraumatic   Eyes:            Lids and lashes normal, conjunctivae and sclerae normal, no   icterus, no pallor, corneas clear   Ears:    Ears appear intact with no abnormalities noted   Neck:   No adenopathy, supple, trachea midline, no thyromegaly   Lungs:     Clear to auscultation, respirations regular, even and                   unlabored    Heart:    Regular rhythm and normal rate, normal S1 and S2, no            murmur, no gallop   Abdomen:     Normal bowel sounds, no masses, no organomegaly, soft        non-tender, non-distended, no guarding, no rebound                 tenderness   Genitalia:    Deferred   Extremities:   Left knee ACE wrap CDI. Nerve block present   Pulses:   Pulses palpable and equal bilaterally   Skin:   No bleeding, bruising or rash   Neurologic:   Cranial nerves 2 - 12 grossly intact, sensation intact. Flexion and dorsiflexion intact bilateral feet.        I reviewed the patient's new clinical results.     Results for KAROL BURGESS (MRN [id number]) as of 10/17/2019 11:36   Ref. Range 10/8/2019 12:30   Glucose Latest Ref Range: 65 - 99 mg/dL 100 (H)   Sodium Latest Ref Range: 136 - 145 mmol/L 141   Potassium Latest Ref Range: 3.5 - 5.2 mmol/L 4.4   CO2 Latest Ref Range: 22.0 - 29.0 mmol/L 27.0   Chloride Latest Ref Range: 98 - 107 mmol/L 104   Anion Gap Latest Ref Range: 5.0 - 15.0 mmol/L 10.0   Creatinine Latest Ref Range: 0.57 - 1.00 mg/dL 0.62   BUN Latest Ref Range: 8 - 23 mg/dL 11    BUN/Creatinine Ratio Latest Ref Range: 7.0 - 25.0  17.7   Calcium Latest Ref Range: 8.6 - 10.5 mg/dL 9.4   eGFR Non  Am Latest Ref Range: >60 mL/min/1.73 97   Hemoglobin A1C Latest Ref Range: 4.80 - 5.60 % 5.10   WBC Latest Ref Range: 3.40 - 10.80 10*3/mm3 6.05   RBC Latest Ref Range: 3.77 - 5.28 10*6/mm3 4.19   Hemoglobin Latest Ref Range: 12.0 - 15.9 g/dL 12.6   Hematocrit Latest Ref Range: 34.0 - 46.6 % 39.6   RDW Latest Ref Range: 12.3 - 15.4 % 12.2 (L)   MCV Latest Ref Range: 79.0 - 97.0 fL 94.5   MCH Latest Ref Range: 26.6 - 33.0 pg 30.1   MCHC Latest Ref Range: 31.5 - 35.7 g/dL 31.8   MPV Latest Ref Range: 6.0 - 12.0 fL 10.1   Platelets Latest Ref Range: 140 - 450 10*3/mm3 240     Assessment and Plan:     Status post total left knee replacement    Primary osteoarthritis of left knee    Hyperlipidemia    HTN (hypertension)      Plan  1. PT/OT- early ambulation postop  2. Pain control-prns, AC nerve block   3. IS-encourage  4. DVT proph- mechs/ASA  5. Bowel regimen  6. Resume home medications as appropriate  7. Monitor post-op labs  8. Discharge planning for home    HTN, HLD  - Continue home lopressor and statin  - Monitor BP   - Holding parameters for BP meds  - Labetalol PRN for SBP>170    History of C. difficile infection.  -Discussed with patient taking probiotics for couple weeks following surgery due to perioperative prophylactic antibiotic.  Also discussed with her holding her Dexilant for 1 to 2 weeks afterwards as well.RAJ Tripp  10/17/19  3:21 PM     I have personally performed the evaluation on this patient. My history is consistent  with HPI obtained. My exam findings are listed above. I have personally reviewed and discussed the above formulated treatment plan with pt and AH.RAJ.wy.

## 2019-10-17 NOTE — H&P
Pre-Op H&P  Ирина Gutierrez  [id number]  1955    Chief complaint: left knee pain.    HPI:    Patient is a 64 y.o.female who presents with left knee pain. Patient sitting up in bed with nurse at bedside. States she has had increased knee pain but no other changes. No CP, or SOB. Patient stable and ready to undergo left total knee replacement today.    Review of Systems:  General ROS: negative for chills, fever or skin lesions;  No changes since last office visit  Cardiovascular ROS: no chest pain or dyspnea on exertion  Respiratory ROS: no cough, shortness of breath, or wheezing    Allergies:   Allergies   Allergen Reactions   • Augmentin [Amoxicillin-Pot Clavulanate] Nausea Only   • Bactrim [Sulfamethoxazole-Trimethoprim] Rash       Home Meds:    No current facility-administered medications on file prior to encounter.      Current Outpatient Medications on File Prior to Encounter   Medication Sig Dispense Refill   • FLUoxetine (PROzac) 20 MG capsule Take 20 mg by mouth Daily.     • HYDROcodone-acetaminophen (NORCO) 7.5-325 MG per tablet Take 1 tablet by mouth Every 4 (Four) Hours As Needed for Moderate Pain  for up to 30 doses. (Patient taking differently: Take 1 tablet by mouth Every 6 (Six) Hours As Needed for Moderate Pain .) 30 tablet 0   • levocetirizine (XYZAL) 5 MG tablet Take 5 mg by mouth Every Evening.     • metoprolol tartrate (LOPRESSOR) 25 MG tablet Take 25 mg by mouth 2 (Two) Times a Day.     • pentosan polysulfate (ELMIRON) 100 MG capsule Take 100 mg by mouth 2 (Two) Times a Day.     • pravastatin (PRAVACHOL) 20 MG tablet Take 20 mg by mouth Daily.     • temazepam (RESTORIL) 30 MG capsule Take 30 mg by mouth At Night As Needed for Sleep.     • aspirin 81 MG EC tablet Take 1 tablet by mouth Daily.         PMH:   Past Medical History:   Diagnosis Date   • Arthritis    • Clostridium difficile infection 07/2018   • Depression    • GERD (gastroesophageal reflux disease)    • High cholesterol    •  Hypertension    • Interstitial cystitis    • LBBB (left bundle branch block)    • Neck pain    • OAB (overactive bladder)    • Squamous cell carcinoma in situ     REMOVED FROM BOTH SHOULDERS, LUE, RLE, AND NOSE    • TIA (transient ischemic attack)     NO RESIDUAL PROBLEMS PER ANXIETY   • Wears reading eyeglasses      PSH:    Past Surgical History:   Procedure Laterality Date   • ANTERIOR CERVICAL DISCECTOMY W/ FUSION N/A 2017    Procedure: CERVICAL DISCECTOMY ANTERIOR WITH FUSION C6-7 CAGE PLATE ALLOGRAFT;  Surgeon: Sanket Murrell MD;  Location: [facility];  Service:    • BLADDER SUSPENSION     • CATARACT EXTRACTION     • COLONOSCOPY     • ENDOSCOPY     • FINGER SURGERY Right     THIRD AND FOURTH FINGER RIGHT HAND    • HYSTERECTOMY           Social History:   Tobacco:   Social History     Tobacco Use   Smoking Status Former Smoker   • Packs/day: 0.50   • Years: 30.00   • Pack years: 15.00   • Types: Cigarettes   • Last attempt to quit:    • Years since quittin.7   Smokeless Tobacco Never Used      Alcohol:     Social History     Substance and Sexual Activity   Alcohol Use No       Vitals:  /68 (BP Location: Right arm, Patient Position: Lying)   Pulse 76   Temp 97.8 °F (36.6 °C) (Temporal)   Resp 18   SpO2 97%     Physical Exam:  General Appearance:    Alert, cooperative, no distress, appears stated age, pleasant   Head:    Normocephalic, without obvious abnormality, atraumatic   Lungs:     Clear to auscultation bilaterally, respirations unlabored    Heart:   Regular rate and rhythm, S1 and S2 normal, no murmur, rub    or gallop    Abdomen:    Soft, non-tender.  +bowel sounds   Breast Exam:    deferred   Genitalia:    deferred   Extremities:   Extremities normal, atraumatic, no cyanosis or edema   Skin:   Skin color, texture, turgor normal, no rashes or lesions   Neurologic:   Grossly intact, POTTER   Results Review  LABS:  Lab Results   Component Value Date    WBC 6.05  10/08/2019    HGB 12.6 10/08/2019    HCT 39.6 10/08/2019    MCV 94.5 10/08/2019     10/08/2019    GLUCOSE 100 (H) 10/08/2019    BUN 11 10/08/2019    CREATININE 0.62 10/08/2019    EGFRIFNONA 97 10/08/2019     10/08/2019    K 4.4 10/08/2019     10/08/2019    CO2 27.0 10/08/2019    CALCIUM 9.4 10/08/2019       RADIOLOGY:  Imaging Results (last 72 hours)     ** No results found for the last 72 hours. **        I reviewed the patient's new clinical results.    Cancer Staging (if applicable)  Cancer Patient: __ yes _x_no __unknown; If yes, clinical stage T:__ N:__M:__, stage group or __N/A    Impression/Plan: Diagnosis left knee osteoarthritis. Left total knee replacement today.    RAJ Gomez Student   10/17/2019   8:25 AM

## 2019-10-17 NOTE — ANESTHESIA PREPROCEDURE EVALUATION
Anesthesia Evaluation     Patient summary reviewed and Nursing notes reviewed   NPO Solid Status: > 8 hours  NPO Liquid Status: < 2 hours           Airway   Mallampati: III  TM distance: >3 FB  Neck ROM: limited  Possible difficult intubation  Dental      Pulmonary    (-) COPD, asthma, shortness of breath, recent URI, not a smoker (3 yrs ago )  Cardiovascular     ECG reviewed  Patient on routine beta blocker    (+) hypertension, dysrhythmias (LBBB : B blockers for tachycardia ), hyperlipidemia,   (-) past MI, angina, cardiac stents    ROS comment: Normal sinus rhythm  Left axis deviation  Left bundle branch block    Neuro/Psych  (+) TIA (remote (eye sx's) - no cause found despite w/u), psychiatric history,     (-) seizures, CVA  GI/Hepatic/Renal/Endo    (+)  GERD,    (-) liver disease, no renal disease, diabetes, hypothyroidism    Musculoskeletal     (+) neck pain (SP ACD ),   Abdominal    Substance History      OB/GYN          Other   (+) arthritis         Phys Exam Other: Mac3 with IIa view                 Anesthesia Plan    ASA 3     spinal   (Propofol sedation    ACB/cath  post op.   )  intravenous induction   Anesthetic plan, all risks, benefits, and alternatives have been provided, discussed and informed consent has been obtained with: patient.    Plan discussed with CRNA.

## 2019-10-17 NOTE — THERAPY EVALUATION
Patient Name: Ирина Gutierrez  : 1955    MRN: [id number]                              Today's Date: 10/17/2019       Admit Date: 10/17/2019    Visit Dx: No diagnosis found.  Patient Active Problem List   Diagnosis   • Herniated disc, cervical, s/p ACDF   • Hyperlipidemia   • Primary osteoarthritis of left knee   • Status post total left knee replacement   • HTN (hypertension)     Past Medical History:   Diagnosis Date   • Arthritis    • Clostridium difficile infection 2018   • Depression    • GERD (gastroesophageal reflux disease)    • High cholesterol    • Hypertension    • Interstitial cystitis    • LBBB (left bundle branch block)    • Neck pain    • OAB (overactive bladder)    • Squamous cell carcinoma in situ     REMOVED FROM BOTH SHOULDERS, LUE, RLE, AND NOSE    • TIA (transient ischemic attack)     NO RESIDUAL PROBLEMS PER ANXIETY   • Wears reading eyeglasses      Past Surgical History:   Procedure Laterality Date   • ANTERIOR CERVICAL DISCECTOMY W/ FUSION N/A 2017    Procedure: CERVICAL DISCECTOMY ANTERIOR WITH FUSION C6-7 CAGE PLATE ALLOGRAFT;  Surgeon: Sanket Murrell MD;  Location: [facility];  Service:    • BLADDER SUSPENSION     • CATARACT EXTRACTION     • COLONOSCOPY     • ENDOSCOPY     • FINGER SURGERY Right     THIRD AND FOURTH FINGER RIGHT HAND    • HYSTERECTOMY       General Information     Row Name 10/17/19 1618          PT Evaluation Time/Intention    Document Type  evaluation  -CS     Mode of Treatment  individual therapy;physical therapy  -CS     Row Name 10/17/19 1618          General Information    Patient Profile Reviewed?  yes  -CS     Prior Level of Function  min assist:;dressing;bathing;home management;cooking;cleaning Increase in L knee pain  -CS     Existing Precautions/Restrictions  fall;other (see comments) hip adductor canal catheter  -CS     Barriers to Rehab  none identified  -CS     Row Name 10/17/19 1618          Relationship/Environment     Lives With  spouse  -CS     Row Name 10/17/19 1618          Resource/Environmental Concerns    Current Living Arrangements  home/apartment/condo  -CS     Row Name 10/17/19 1618          Home Main Entrance    Number of Stairs, Main Entrance  one  -CS     Stair Railings, Main Entrance  none  -CS     Row Name 10/17/19 1618          Stairs Within Home, Primary    Stairs, Within Home, Primary  1 story home   -CS     Number of Stairs, Within Home, Primary  none  -CS     Row Name 10/17/19 1618          Cognitive Assessment/Intervention- PT/OT    Orientation Status (Cognition)  oriented x 4  -CS     Row Name 10/17/19 1618          Safety Issues, Functional Mobility    Safety Issues Affecting Function (Mobility)  safety precautions follow-through/compliance;safety precaution awareness;insight into deficits/self awareness  -CS     Impairments Affecting Function (Mobility)  balance;endurance/activity tolerance;pain;range of motion (ROM);strength  -CS       User Key  (r) = Recorded By, (t) = Taken By, (c) = Cosigned By    Initials Name Provider Type    Shagufta Mendoza, PT Physical Therapist        Mobility     Row Name 10/17/19 1618          Bed Mobility Assessment/Treatment    Bed Mobility Assessment/Treatment  supine-sit  -CS     Supine-Sit [place] (Bed Mobility)  minimum assist (75% patient effort) min assist with bringing L LE to EOB  -CS     Assistive Device (Bed Mobility)  head of bed elevated;bed rails  -     Comment (Bed Mobility)  VC's to bring LE's to EOB and to scoot to EOB. Min assist for L LE.  -CS     Row Name 10/17/19 1618          Transfer Assessment/Treatment    Comment (Transfers)  VC's to push off from bed to stand and to reach back for chair with sitting. VC's on sequencing walker with turns.   -CS     Row Name 10/17/19 1618          Bed-Chair Transfer    Bed-Chair [place] (Transfers)  contact guard;verbal cues;2 person assist  -CS     Assistive Device (Bed-Chair Transfers)  walker,  front-wheeled  -CS     Row Name 10/17/19 1618          Sit-Stand Transfer    Sit-Stand [place] (Transfers)  contact guard;2 person assist;verbal cues  -CS     Assistive Device (Sit-Stand Transfers)  walker, front-wheeled  -CS     Row Name 10/17/19 1618          Gait/Stairs Assessment/Training    04541 - Gait Training Minutes   8  -CS     Gait/Stairs Assessment/Training  gait/ambulation independence;gait/ambulation assistive device;distance ambulated;gait pattern;gait deviations  -CS     [place] Level (Gait)  contact guard;verbal cues;2 person assist  -CS     Assistive Device (Gait)  walker, front-wheeled  -CS     Distance in Feet (Gait)  16'  -CS     Pattern (Gait)  step-to  -CS     Deviations/Abnormal Patterns (Gait)  bilateral deviations;patrick decreased;gait speed decreased;stride length decreased  -CS     Bilateral Gait Deviations  forward flexed posture  -CS     Left Sided Gait Deviations  heel strike decreased;weight shift ability decreased;knee buckling, left side  -CS     [place] Level (Stairs)  not tested  -CS     Comment (Gait/Stairs)  Pt able to amb 16' with RW CGA x 2 with max cues on sequencing of feet with walker. Required cues to stay within walker. Pt performed step to gait pattern due to increase in pain. Pt had slight buckling on L LE in stance phase. Patient was limited with nausea, pain, and fatigue.   -CS     Row Name 10/17/19 1618          Mobility Assessment/Intervention    Extremity Weight-bearing Status  left lower extremity  -CS     Left Lower Extremity (Weight-bearing Status)  weight-bearing as tolerated (WBAT)  -CS       User Key  (r) = Recorded By, (t) = Taken By, (c) = Cosigned By    Initials Name Provider Type    Shagufta Mendoza, PT Physical Therapist        Obj/Interventions     Row Name 10/17/19 1618          General ROM    GENERAL ROM COMMENTS  Will assess POD #1  -CS     Row Name 10/17/19 1618          MMT (Manual Muscle Testing)    General MMT Comments  Pt L  LE MMT 4-/5. Pt able to independently perform SLR.   -CS     Row Name 10/17/19 1618          Therapeutic Exercise    Lower Extremity (Therapeutic Exercise)  gluteal sets;quad sets, bilateral  -CS     Lower Extremity Range of Motion (Therapeutic Exercise)  ankle dorsiflexion/plantar flexion, bilateral  -CS     Exercise Type (Therapeutic Exercise)  isometric contraction, static;isotonic contraction, concentric  -CS     Position (Therapeutic Exercise)  supine  -CS     Sets/Reps (Therapeutic Exercise)  x10 reps each   -CS     Comment (Therapeutic Exercise)  VC's on technique. Pt able to actively DF and PF.   -CS     Row Name 10/17/19 1618          Static Sitting Balance    Level of [place] (Unsupported Sitting, Static Balance)  contact guard assist  -CS     Sitting Position (Unsupported Sitting, Static Balance)  sitting on edge of bed  -CS     Time Able to Maintain Position (Unsupported Sitting, Static Balance)  3 to 4 minutes  -CS     Row Name 10/17/19 1618          Static Standing Balance    Level of [place] (Supported Standing, Static Balance)  contact guard assist;2 person assist  -CS     Time Able to Maintain Position (Supported Standing, Static Balance)  1 to 2 minutes  -CS     Assistive Device Utilized (Supported Standing, Static Balance)  walker, rolling  -CS     Row Name 10/17/19 1618          Sensory Assessment/Intervention    Sensory General Assessment  no sensation deficits identified  -CS       User Key  (r) = Recorded By, (t) = Taken By, (c) = Cosigned By    Initials Name Provider Type    CS Shagufta Velasco PT Physical Therapist        Goals/Plan     Row Name 10/17/19 1618          Bed Mobility Goal 1 (PT)    Activity/Assistive Device (Bed Mobility Goal 1, PT)  bed mobility activities, all  -CS     [place] Level/Cues Needed (Bed Mobility Goal 1, PT)  conditional independence  -CS     Time Frame (Bed Mobility Goal 1, PT)  long term goal (LTG);3 days  -CS     Progress/Outcomes (Bed  Mobility Goal 1, PT)  goal ongoing  -CS     Row Name 10/17/19 1618          Transfer Goal 1 (PT)    Activity/Assistive Device (Transfer Goal 1, PT)  sit-to-stand/stand-to-sit;bed-to-chair/chair-to-bed  -CS     [place] Level/Cues Needed (Transfer Goal 1, PT)  conditional independence  -CS     Time Frame (Transfer Goal 1, PT)  long term goal (LTG);3 days  -CS     Progress/Outcome (Transfer Goal 1, PT)  goal ongoing  -CS     Row Name 10/17/19 1618          Gait Training Goal 1 (PT)    Activity/Assistive Device (Gait Training Goal 1, PT)  gait (walking locomotion);walker, rolling  -CS     [place] Level (Gait Training Goal 1, PT)  supervision required  -CS     Time Frame (Gait Training Goal 1, PT)  long term goal (LTG);3 days  -CS     Barriers (Gait Training Goal 1, PT)  150'  -CS     Progress/Outcome (Gait Training Goal 1, PT)  goal ongoing  -CS     Row Name 10/17/19 1618          ROM Goal 1 (PT)    ROM Goal 1 (PT)  0-90 deg knee AAROM  -CS     Time Frame (ROM Goal 1, PT)  long term goal (LTG);3 days  -CS     Progress/Outcome (ROM Goal 1, PT)  goal ongoing  -CS     Row Name 10/17/19 1618          Stairs Goal 1 (PT)    Activity/Assistive Device (Stairs Goal 1, PT)  stairs, all skills;walker, rolling  -CS     [place] Level/Cues Needed (Stairs Goal 1, PT)  standby assist  -CS     Number of Stairs (Stairs Goal 1, PT)  1  -CS     Time Frame (Stairs Goal 1, PT)  long term goal (LTG);3 days  -CS       User Key  (r) = Recorded By, (t) = Taken By, (c) = Cosigned By    Initials Name Provider Type    CS Shagufta Velasco, PT Physical Therapist        Clinical Impression     Row Name 10/17/19 1618          Pain Assessment    Additional Documentation  Pain Scale: Numbers Pre/Post-Treatment (Group)  -CS     Row Name 10/17/19 1618          Pain Scale: Numbers Pre/Post-Treatment    Pain Scale: Numbers, Pretreatment  10/10  -CS     Pain Scale: Numbers, Post-Treatment  10/10  -CS     Pain Location - Side  Left  -CS      Pain Location - Orientation  anterior  -CS     Pain Location  knee  -CS     Pre/Post Treatment Pain Comment  Pt was premedicated.   -CS     Pain Intervention(s)  Repositioned;Ambulation/increased activity  -CS     Row Name 10/17/19 1618          Plan of Care Review    Plan of Care Reviewed With  patient;spouse  -CS     Row Name 10/17/19 1618          Physical Therapy Clinical Impression    Patient/Family Goals Statement (PT Clinical Impression)  To go home.   -CS     Criteria for Skilled Interventions Met (PT Clinical Impression)  yes;treatment indicated  -CS     Rehab Potential (PT Clinical Summary)  good, to achieve stated therapy goals  -CS     Predicted Duration of Therapy (PT)  3 days   -CS     Row Name 10/17/19 1618          Positioning and Restraints    Pre-Treatment Position  in bed  -CS     Post Treatment Position  chair  -CS     In Chair  reclined;call light within reach;encouraged to call for assist;exit alarm on;with family/caregiver  -CS       User Key  (r) = Recorded By, (t) = Taken By, (c) = Cosigned By    Initials Name Provider Type    Shagufta Mendoza, PT Physical Therapist        Outcome Measures     Row Name 10/17/19 1618          How much help from another person do you currently need...    Turning from your back to your side while in flat bed without using bedrails?  3  -CS     Moving from lying on back to sitting on the side of a flat bed without bedrails?  3  -CS     Moving to and from a bed to a chair (including a wheelchair)?  3  -CS     Standing up from a chair using your arms (e.g., wheelchair, bedside chair)?  3  -CS     Climbing 3-5 steps with a railing?  2  -CS     To walk in hospital room?  3  -CS     AM-PAC 6 Clicks Score (PT)  17  -CS     Row Name 10/17/19 1618          Functional Assessment    Outcome Measure Options  AM-PAC 6 Clicks Basic Mobility (PT)  -CS       User Key  (r) = Recorded By, (t) = Taken By, (c) = Cosigned By    Initials Name Provider Type    JAYDEN Velasco  VENITA [facility] Physical Therapist        Physical Therapy Education     Title: PT OT SLP Therapies (Done)     Topic: Physical Therapy (Done)     Point: Mobility training (Done)     Learning Progress Summary           Patient Acceptance, E,D, VU,NR by CS at 10/17/2019  4:18 PM    Comment:  Educated patient on precautions with knee replacement, hand placement with walker sit to stand, gait mechanics with ambulation with walker, to feel chair behind her when sitting.   Significant Other Acceptance, E,D, VU,NR by CS at 10/17/2019  4:18 PM    Acceptance, E,D, VU,NR by CS at 10/17/2019  4:18 PM    Comment:  Educated patient on precautions with knee replacement, hand placement with walker sit to stand, gait mechanics with ambulation with walker, to feel chair behind her when sitting.                   Point: Home exercise program (Done)     Learning Progress Summary           Patient Acceptance, E,D, VU,NR by CS at 10/17/2019  4:18 PM    Acceptance, E,D, VU,NR by CS at 10/17/2019  4:18 PM    Comment:  Educated patient on precautions with knee replacement, hand placement with walker sit to stand, gait mechanics with ambulation with walker, to feel chair behind her when sitting.   Significant Other Acceptance, E,D, VU,NR by CS at 10/17/2019  4:18 PM    Acceptance, E,D, VU,NR by CS at 10/17/2019  4:18 PM    Comment:  Educated patient on precautions with knee replacement, hand placement with walker sit to stand, gait mechanics with ambulation with walker, to feel chair behind her when sitting.                   Point: Body mechanics (Done)     Learning Progress Summary           Patient Acceptance, E,D, VU,NR by CS at 10/17/2019  4:18 PM    Acceptance, E,D, VU,NR by CS at 10/17/2019  4:18 PM    Comment:  Educated patient on precautions with knee replacement, hand placement with walker sit to stand, gait mechanics with ambulation with walker, to feel chair behind her when sitting.   Significant Other Acceptance, E,D, VU,NR by CS  at 10/17/2019  4:18 PM    Acceptance, E,D, VU,NR by  at 10/17/2019  4:18 PM    Comment:  Educated patient on precautions with knee replacement, hand placement with walker sit to stand, gait mechanics with ambulation with walker, to feel chair behind her when sitting.                   Point: Precautions (Done)     Learning Progress Summary           Patient Acceptance, E,D, VU,NR by  at 10/17/2019  4:18 PM    Acceptance, E,D, VU,NR by CS at 10/17/2019  4:18 PM    Comment:  Educated patient on precautions with knee replacement, hand placement with walker sit to stand, gait mechanics with ambulation with walker, to feel chair behind her when sitting.   Significant Other Acceptance, E,D, VU,NR by  at 10/17/2019  4:18 PM    Acceptance, E,D, VU,NR by  at 10/17/2019  4:18 PM    Comment:  Educated patient on precautions with knee replacement, hand placement with walker sit to stand, gait mechanics with ambulation with walker, to feel chair behind her when sitting.                               User Key     Initials Effective Dates Name Provider Type Discipline     03/26/19 -  Shagufta Velasco, PT Physical Therapist PT              PT Recommendation and Plan  Planned Therapy Interventions (PT Eval): balance training, gait training, bed mobility training, home exercise program, neuromuscular re-education, patient/family education, ROM (range of motion), stair training, strengthening, transfer training  Outcome Summary/Treatment Plan (PT)  Anticipated Discharge Disposition (PT): home with assist, home with home health  Plan of Care Reviewed With: patient, spouse  Progress: improving  Outcome Summary: Pt amb 16' with RW CGA x 2 with VC's to stay within walker and sequencing on steps with walker. Pt had slight buckling of L LE. Pt was limited by nausea, pain, and fatigue. Pt is min assist x 1 for supine to sit. Encouraged patient to ambulate with nursing another time tonight. Recommend home with assist and home  health at discharge. PADD: 8     Time Calculation:   PT Charges     Row Name 10/17/19 1618             Time Calculation    Start Time  1618  -CS      PT Received On  10/17/19  -CS      PT Goal Re-Cert Due Date  10/27/19  -CS         Time Calculation- PT    Total Timed Code Minutes- PT  12 minute(s)  -CS         Timed Charges    31950 - PT Therapeutic Exercise Minutes  4  -CS      66828 - Gait Training Minutes   8  -CS        User Key  (r) = Recorded By, (t) = Taken By, (c) = Cosigned By    Initials Name Provider Type    CS Shagufta Velasco, PT Physical Therapist        Therapy Charges for Today     Code Description Service Date Service Provider Modifiers Qty    72094318351 HC GAIT TRAINING EA 15 MIN 10/17/2019 Shagufta Velasco, PT GP 1    99428392493 HC PT THER SUPP EA 15 MIN 10/17/2019 Shagufta Velasco, PT GP 2    12922649262 HC PT EVAL MOD COMPLEXITY 3 10/17/2019 Shagufta Velasco, PT GP 1          PT G-Codes  Outcome Measure Options: AM-PAC 6 Clicks Basic Mobility (PT)  AM-PAC 6 Clicks Score (PT): Daisy Velasco PT  10/17/2019

## 2019-10-17 NOTE — ANESTHESIA PROCEDURE NOTES
Spinal Block      Patient reassessed immediately prior to procedure    Patient location during procedure: OR  Start Time: 10/17/2019 9:48 AM  Indication:at surgeon's request  Performed By  CRNA: Kofi Thomas CRNA  Preanesthetic Checklist  Completed: patient identified, site marked, surgical consent, pre-op evaluation, timeout performed, IV checked, risks and benefits discussed and monitors and equipment checked  Spinal Block Prep:  Patient Position:sitting  Sterile Tech:cap, gloves, sterile barriers and mask  Prep:Chloraprep  Patient Monitoring:blood pressure monitoring, continuous pulse oximetry and EKG  Spinal Block Procedure  Approach:midline  Guidance:landmark technique and palpation technique  Location:L4-L5  Needle Type:Nathaniel (Introducer:   no)  Needle Gauge:25 G  Placement of Spinal needle event:cerebrospinal fluid aspirated  Paresthesia: no  Fluid Appearance:clear  Medications: bupivacaine (MARCAINE) 0.5 % injection, 1.8 mL  Med Administered at 10/17/2019 9:48 AM   Post Assessment  Patient Tolerance:patient tolerated the procedure well with no apparent complications  Complications no  Additional Notes  Procedure:  Pt assisted to sitting position, with legs in position of comfort over side of bed.  Pt. instructed in optimal spine presentation, the spine was prepped/ Draped and the skin at insertion site was anesthetized with 1% Lidocaine 2 ml.  The spinal needle was then advanced until CSF flow was obtained and LA was injected:

## 2019-10-17 NOTE — OP NOTE
Preoperative diagnosis:Left knee end stage osteoarthritis    Postoperative diagnosis: Left knee end stage osteoarthritis    Operative procedure: Left total knee arthroplasty    Surgeon: Dr. Sekou Diez    Assistant:GILLIAN Uribe.  Necessary during the case for positioning of the patient, exposure, implantation of components and closure.    Anesthesia: Spinal with local and adductor canal catheter    Estimated blood loss: Minimal    Implants: [employer]  posterior stabilized total knee arthroplasty size 4 femur, 2 tibia, 29 thin patella, 9 polyethylene.    Tourniquet time: 58 minutes at 300 mmHg    Indications for procedure: Ирина is a very pleasant 64-year-old female who has struggled with end-stage activity limiting left knee pain secondary to osteoarthritis.  X-rays in August revealed moderate tricompartmental degenerative changes in a varus knee with loss of medial joint space and marginal osteophyte formation.  They have failed exhaustive conservative treatment measures including cortisone injections, viscosupplementation injections in May/June 2019 and physical therapy.  She takes hydrocodone and over-the-counter anti-inflammatories with minimal relief.  After undergoing a shared decision-making process they agreed to proceed with left total knee arthroplasty.  Surgical consent form was signed.    Description of procedure: The patient was seen in the preoperative holding area and the left leg was signed to confirm the correct operative site.  They were seen by anesthesia.  They received clindamycin 900 mg IV prophylactic antibiotics within 1 hour of incision time.  They were brought back to the operating room.  Spinal was performed without difficulty and they were given sedation throughout the case.  Patient placed in the supine position and all bony prominences were well-padded.  A bump was placed underneath the left hip.  Nonsterile tourniquet was applied to the thigh.  The left lower extremity was  prepped and draped in the usual sterile fashion and timeout was performed to confirm left total knee arthroplasty for patient Ирина Gutierrez.    The left lower extremity was exsanguinated with an Esmarch.  Tourniquet was inflated to 300 mmHg.  With the knee flexed a midline incision was made with a 10 blade scalpel.  Adequate hemostasis maintained with Bovie electrocautery.  Full-thickness medial and lateral flaps were elevated.  Using a fresh 10 blade scalpel I made a medial parapatellar arthrotomy.  The patella was everted.  Patella fat pad and anterior femoral fat pads were excised.  Marginal osteophytes were removed.  Medial release was performed for this varus knee using Bovie electrocautery.  Diane's line was marked.  Z retractors were placed medially and laterally.  The distal femur was then drilled and intramedullary distal femoral cutting guide was pinned in place set at a 5° valgus cut for a 9.5 mm cut.  This cut was then made with the oscillating saw.  The femur was then sized to a size 4 set at 3° of external rotation.  4-in-1 cutting guide was pinned in place.  Anterior and posterior cuts were made as were the chamfer cuts.  Cut bone was removed.  We then turned our attention to the tibia.    The tibia was subluxed anteriorly. PCL retractor was placed as were medial and lateral Hohmann retractors.  We then used the extramedullary tibial cutting guide set at 3° posterior slope for the proximal tibial cut.  5 mm of bone was removed from the low medial side and a centimeter from the high lateral side.  Medial and lateral menisci were then excised as were posterior osteophytes.  Flexion and extension gaps were then checked and with a 9 mm block we achieved full extension and flexion with excellent alignment. The tibia was sized to a 2 tibial tray centered off the medial third of the tibial tubercle.  The tray was pinned in place.  We then impacted the tibial fins.  Size 4  trial femur was then placed and  box cut was made with the reamer and punch. We trialed with a size 9 polyethylene, 4 femur and 2 tibia.  With these trial components in place we achieved full extension and flexion with excellent alignment and stable throughout.     We then turned our attention to the patella.  Patella was sized to 19 mm in thickness and we made a 9 mm patellar cut with the reciprocal saw.  A 29 thin trial was placed and the patella drill holes were made.  With the trial button in place there was excellent tracking with the no thumbs technique.    Trial components were then removed.  Final components were opened on the back table.  Posterior capsule was injected with 20 mL of half percent ropivacaine and 5 mg of morphine.  Cement was mixed on the back table.  The knee was copiously irrigated with Pulsavac lavage.  The knee was thoroughly dried and a bone plug was placed in the distal femoral drill hole.  Cement was then applied to the tibia and final size 2 tibial tray was impacted in place and excess cement was removed.  Cement was applied to the femur and final size 4 narrow femur was impacted in place and excess cement removed.  We then placed a 9 mm polyethylene-this was seen to be fully seated in the tibial tray.  Knee was then placed in extension and we applied cement to the cut patellar surface and 29 thin patella was held in place with patellar clamp.  All excess cement was removed.  The knee was left in extension while cement cured.    Once the cement was fully cured we irrigated the knee with diluted Betadine solution and Pulsavac lavage.  The knee was taken through full range of motion and seen to achieve full extension and flexion with excellent patellar tracking.    We then proceeded with closure.  The deep fascia was closed with a #1 Stratafix barbed suture.  Dermis was closed with 2-0 Vicryl.  Skin was closed with a running 3-0 Stratafix barbed subcuticular suture.  A sterile dressing was then applied with Prineo mesh  dressing and Dermabond.  We then applied 4 x 4's, ABDs, soft roll and a six-inch Ace bandage.    Tourniquet was deflated at 58 minutes.  Patient was transferred to recovery in stable condition.  All sponge and needle counts were correct ×2.  Patient received first dose of tranexamic acid just prior to incision and the second dose 5-10 minutes prior to letting down the tourniquet to minimize bleeding.    Postoperative plan:  Patient will be admitted to Dr. ROCK for medical management  Mobilize starting today with PT/OT as tolerated  Start aspirin for DVT prophylaxis tomorrow   consult for home physical therapy and home equipment needs  Follow-up with me in 2-3 weeks.    Grant Diez MD  10/17/19  11:32 AM

## 2019-10-18 VITALS
RESPIRATION RATE: 16 BRPM | BODY MASS INDEX: 35 KG/M2 | HEART RATE: 71 BPM | SYSTOLIC BLOOD PRESSURE: 122 MMHG | DIASTOLIC BLOOD PRESSURE: 58 MMHG | WEIGHT: 205 LBS | HEIGHT: 64 IN | TEMPERATURE: 98.3 F | OXYGEN SATURATION: 96 %

## 2019-10-18 LAB
ANION GAP SERPL CALCULATED.3IONS-SCNC: 7 MMOL/L (ref 5–15)
BASOPHILS # BLD AUTO: 0.02 10*3/MM3 (ref 0–0.2)
BASOPHILS NFR BLD AUTO: 0.3 % (ref 0–1.5)
BUN BLD-MCNC: 15 MG/DL (ref 8–23)
BUN/CREAT SERPL: 21.7 (ref 7–25)
CALCIUM SPEC-SCNC: 8.5 MG/DL (ref 8.6–10.5)
CHLORIDE SERPL-SCNC: 108 MMOL/L (ref 98–107)
CO2 SERPL-SCNC: 25 MMOL/L (ref 22–29)
CREAT BLD-MCNC: 0.69 MG/DL (ref 0.57–1)
DEPRECATED RDW RBC AUTO: 44.7 FL (ref 37–54)
EOSINOPHIL # BLD AUTO: 0 10*3/MM3 (ref 0–0.4)
EOSINOPHIL NFR BLD AUTO: 0 % (ref 0.3–6.2)
ERYTHROCYTE [DISTWIDTH] IN BLOOD BY AUTOMATED COUNT: 12.5 % (ref 12.3–15.4)
GFR SERPL CREATININE-BSD FRML MDRD: 86 ML/MIN/1.73
GLUCOSE BLD-MCNC: 146 MG/DL (ref 65–99)
HCT VFR BLD AUTO: 33.6 % (ref 34–46.6)
HGB BLD-MCNC: 10.5 G/DL (ref 12–15.9)
IMM GRANULOCYTES # BLD AUTO: 0.02 10*3/MM3 (ref 0–0.05)
IMM GRANULOCYTES NFR BLD AUTO: 0.3 % (ref 0–0.5)
LYMPHOCYTES # BLD AUTO: 1.06 10*3/MM3 (ref 0.7–3.1)
LYMPHOCYTES NFR BLD AUTO: 13.7 % (ref 19.6–45.3)
MCH RBC QN AUTO: 30.4 PG (ref 26.6–33)
MCHC RBC AUTO-ENTMCNC: 31.3 G/DL (ref 31.5–35.7)
MCV RBC AUTO: 97.4 FL (ref 79–97)
MONOCYTES # BLD AUTO: 0.68 10*3/MM3 (ref 0.1–0.9)
MONOCYTES NFR BLD AUTO: 8.8 % (ref 5–12)
NEUTROPHILS # BLD AUTO: 5.95 10*3/MM3 (ref 1.7–7)
NEUTROPHILS NFR BLD AUTO: 76.9 % (ref 42.7–76)
NRBC BLD AUTO-RTO: 0 /100 WBC (ref 0–0.2)
PLATELET # BLD AUTO: 213 10*3/MM3 (ref 140–450)
PMV BLD AUTO: 10.2 FL (ref 6–12)
POTASSIUM BLD-SCNC: 4 MMOL/L (ref 3.5–5.2)
RBC # BLD AUTO: 3.45 10*6/MM3 (ref 3.77–5.28)
SODIUM BLD-SCNC: 140 MMOL/L (ref 136–145)
WBC NRBC COR # BLD: 7.73 10*3/MM3 (ref 3.4–10.8)

## 2019-10-18 PROCEDURE — 97535 SELF CARE MNGMENT TRAINING: CPT | Performed by: OCCUPATIONAL THERAPIST

## 2019-10-18 PROCEDURE — 97110 THERAPEUTIC EXERCISES: CPT

## 2019-10-18 PROCEDURE — 97165 OT EVAL LOW COMPLEX 30 MIN: CPT | Performed by: OCCUPATIONAL THERAPIST

## 2019-10-18 PROCEDURE — 85025 COMPLETE CBC W/AUTO DIFF WBC: CPT | Performed by: ORTHOPAEDIC SURGERY

## 2019-10-18 PROCEDURE — 97116 GAIT TRAINING THERAPY: CPT

## 2019-10-18 PROCEDURE — 80048 BASIC METABOLIC PNL TOTAL CA: CPT | Performed by: ORTHOPAEDIC SURGERY

## 2019-10-18 RX ORDER — DOCUSATE SODIUM 100 MG/1
100 CAPSULE, LIQUID FILLED ORAL 2 TIMES DAILY PRN
Qty: 60 CAPSULE | Refills: 0 | Status: SHIPPED | OUTPATIENT
Start: 2019-10-18 | End: 2020-09-10

## 2019-10-18 RX ORDER — L.ACID,PARA/B.BIFIDUM/S.THERM 8B CELL
1 CAPSULE ORAL DAILY
Qty: 14 CAPSULE | Refills: 0 | Status: SHIPPED | OUTPATIENT
Start: 2019-10-19 | End: 2020-09-10

## 2019-10-18 RX ORDER — HYDROCODONE BITARTRATE AND ACETAMINOPHEN 7.5; 325 MG/1; MG/1
1 TABLET ORAL EVERY 4 HOURS PRN
Qty: 40 TABLET | Refills: 0 | Status: SHIPPED | OUTPATIENT
Start: 2019-10-18 | End: 2019-10-27

## 2019-10-18 RX ORDER — ASPIRIN 81 MG/1
81 TABLET ORAL DAILY
Status: ON HOLD
Start: 2019-10-18 | End: 2020-09-25 | Stop reason: SDUPTHER

## 2019-10-18 RX ADMIN — METOPROLOL TARTRATE 25 MG: 25 TABLET ORAL at 08:31

## 2019-10-18 RX ADMIN — HYDROCODONE BITARTRATE AND ACETAMINOPHEN 1 TABLET: 7.5; 325 TABLET ORAL at 14:51

## 2019-10-18 RX ADMIN — HYDROCODONE BITARTRATE AND ACETAMINOPHEN 1 TABLET: 7.5; 325 TABLET ORAL at 05:39

## 2019-10-18 RX ADMIN — HYDROCODONE BITARTRATE AND ACETAMINOPHEN 1 TABLET: 7.5; 325 TABLET ORAL at 00:06

## 2019-10-18 RX ADMIN — Medication 1 CAPSULE: at 08:30

## 2019-10-18 RX ADMIN — OXYBUTYNIN CHLORIDE 10 MG: 5 TABLET, EXTENDED RELEASE ORAL at 08:31

## 2019-10-18 RX ADMIN — HYDROCODONE BITARTRATE AND ACETAMINOPHEN 1 TABLET: 7.5; 325 TABLET ORAL at 10:10

## 2019-10-18 RX ADMIN — PANTOPRAZOLE SODIUM 40 MG: 40 TABLET, DELAYED RELEASE ORAL at 08:31

## 2019-10-18 RX ADMIN — SODIUM CHLORIDE 100 ML/HR: 9 INJECTION, SOLUTION INTRAVENOUS at 03:56

## 2019-10-18 RX ADMIN — PENTOSAN POLYSULFATE SODIUM 100 MG: 100 CAPSULE, GELATIN COATED ORAL at 08:30

## 2019-10-18 RX ADMIN — ASPIRIN 325 MG: 325 TABLET, DELAYED RELEASE ORAL at 08:31

## 2019-10-18 RX ADMIN — CLINDAMYCIN PHOSPHATE 900 MG: 900 INJECTION, SOLUTION INTRAVENOUS at 00:01

## 2019-10-18 RX ADMIN — MELOXICAM 15 MG: 7.5 TABLET ORAL at 08:30

## 2019-10-18 RX ADMIN — FLUOXETINE HYDROCHLORIDE 20 MG: 20 CAPSULE ORAL at 08:31

## 2019-10-18 RX ADMIN — POLYETHYLENE GLYCOL 3350 17 G: 17 POWDER, FOR SOLUTION ORAL at 08:29

## 2019-10-18 NOTE — NURSING NOTE
Acute Pain Service:  Peripheral nerve catheter and disposable infusion device teaching completed with patient. Discussion, handout and bracelet provided with [facility] on call central phone number.  Instructed to call with any questions or concerns.  Patient verbalized understanding.  Service will continue to follow until catheter DC'd.  Please contact patient at H: [phone] or C: [phone] if needed.

## 2019-10-18 NOTE — DISCHARGE PLACEMENT REQUEST
Ирина Gutierrez (64 y.o. Female)       SANJAY Cabrera Case Manager, [phone]      [address]  [address] [facility] [address]  Phone:  [phone]  Fax:   Date: Oct 18, 2019      Ambulatory Referral to Home Health     Patient:  Ирина Gutierrez MRN:  [id number]   [address]  [facility] [address] :  1955  SSN:    Phone: [phone] Sex:  F      INSURANCE PAYOR PLAN GROUP # SUBSCRIBER ID   Primary:    [facility] [id number]      Referring Provider Information:  ANGELA INMAN Phone: [phone] Fax:       Referral Information:   # Visits:  1 Referral Type: Home Health [42]   Urgency:  Routine Referral Reason: Specialty Services Required   Start Date: Oct 18, 2019 End Date:  To be determined by Insurer   Diagnosis: Status post total left knee replacement (Z96.652 [ICD-10-CM] V43.65 [ICD-9-CM])      Refer to Dept:   Refer to Provider:   Refer to Facility:  [facility]     Face to Face Visit Date: 10/18/2019  Follow-up provider for Plan of Care? I will be treating the patient on an ongoing basis.  Please send me the Plan of Care for signature.  Follow-up provider: ANGELA INMAN [[id number]]  Reason/Clinical Findings: s/p left knee replacement  Describe mobility limitations that make leaving home difficult: impaired functional mobility, balance, gait, and endurance  Nursing/Therapeutic Services Requested: Physical Therapy  PT orders: Total joint pathway     This document serves as a request of services and does not constitute Insurance authorization or approval of services.  To determine eligibility, please contact the members Insurance carrier to verify and review coverage.     If you have medical questions regarding this request for services. Please contact [address] at [phone] between the hours of 8:00am - 5:00pm (Mon-Fri).       Verbal Order Mode: Per protocol: rose  "required  Authorizing Provider: Grant Diez MD  Authorizing Provider's NPI: [id number]     Order Entered By: Deborah Funes RN 10/18/2019 10:40 AM                     Date of Birth Social Security Number Address Home Phone MRN    1955  [address] [facility] [address] [phone] [id number]    Uatsdin Marital Status          None        Admission Date Admission Type Admitting Provider Attending Provider Department, Room/Bed    10/17/19 Elective Grant Diez MD Coy, Samuel Christopher, MD [facility] 3H, S380/1    Discharge Date Discharge Disposition Discharge Destination                       Attending Provider:  Grant Diez MD    Allergies:  Augmentin [Amoxicillin-pot Clavulanate], Bactrim [Sulfamethoxazole-trimethoprim]    Isolation:  None   Infection:  None   Code Status:  CPR    Ht:  162.6 cm (64\")   Wt:  93 kg (205 lb)    Admission Cmt:  None   Principal Problem:  Status post total left knee replacement [Z96.652]                 Active Insurance as of 10/17/2019     Primary Coverage     Payor Plan Insurance Group Employer/Plan Group    [facility] [id number]     Payor Plan Address Payor Plan Phone Number Payor Plan Fax Number Effective Dates    [address] [address] [phone]  2015 - None Entered    [facility] [id number]       Subscriber Name Subscriber Birth Date Member ID       MARION BURGESS 1950 [id number]                 Emergency Contacts      (Rel.) Home Phone Work Phone Mobile Phone    Marion Burgess (Spouse) [phone] -- [phone]            Insurance Information                [facility] Phone: [phone]    Subscriber: Marion Burgess Subscriber#: [id number]    Group#: [id number] Precert#:              History & Physical      Michael Haney MD at 10/17/19 1133          Patient Name: Ирина Burgess  MRN: [id number]  : 1955  DOS: 10/17/2019    Attending: Grant Diez" Deepak,*    Primary Care Provider: Preston Grant MD      Chief complaint:  Left knee pain    Subjective   Patient is a 64 y.o. female presented for left total knee arthroplasty by Dr. Diez.    She underwent surgery under spinal anesthesia. She tolerated surgery well and is admitted for further medical management.     She is known to us from previous admission for ACDF in Nov 2017; which she recovered well.    When seen in PACU she is doing well. Her pain is well controlled. She denies nausea, shortness of breath or chest pain. No hx of DVT or PE.    She has hx of cdiff last June, HTN, HLD.    (Above is noted, agree.  Doing very well when I saw her in room afterwards.  Good pain control.  No nausea vomiting or shortness of breath.  Walked a short distance with PT 16 feet with a rolling walker and contact-guard assist of 2.  No nausea.  No vomiting.  No shortness of breath.  Currently resting in bed.)wy    Allergies:  Allergies   Allergen Reactions   • Augmentin [Amoxicillin-Pot Clavulanate] Nausea Only   • Bactrim [Sulfamethoxazole-Trimethoprim] Rash       Meds:  Medications Prior to Admission   Medication Sig Dispense Refill Last Dose   • dexlansoprazole (DEXILANT) 60 MG capsule Take 60 mg by mouth Daily.   10/16/2019   • fesoterodine fumarate (TOVIAZ ER) 8 MG tablet sustained-release 24 hour tablet Take 8 mg by mouth Daily.   10/16/2019   • FLUoxetine (PROzac) 20 MG capsule Take 20 mg by mouth Daily.   10/16/2019   • HYDROcodone-acetaminophen (NORCO) 7.5-325 MG per tablet Take 1 tablet by mouth Every 4 (Four) Hours As Needed for Moderate Pain  for up to 30 doses. (Patient taking differently: Take 1 tablet by mouth Every 6 (Six) Hours As Needed for Moderate Pain .) 30 tablet 0 10/16/2019   • levocetirizine (XYZAL) 5 MG tablet Take 5 mg by mouth Every Evening.   10/16/2019   • metoprolol tartrate (LOPRESSOR) 25 MG tablet Take 25 mg by mouth 2 (Two) Times a Day.   10/17/2019 at 0430   • Mirabegron ER  (MYRBETRIQ) 25 MG tablet sustained-release 24 hour 24 hr tablet Take 25 mg by mouth Daily.   10/16/2019   • pentosan polysulfate (ELMIRON) 100 MG capsule Take 100 mg by mouth 2 (Two) Times a Day.   10/16/2019   • Polyethylene Glycol 3350 (MIRALAX PO) Take 1 dose by mouth Daily.   10/16/2019   • pravastatin (PRAVACHOL) 20 MG tablet Take 20 mg by mouth Daily.   10/16/2019   • temazepam (RESTORIL) 30 MG capsule Take 30 mg by mouth At Night As Needed for Sleep.   10/16/2019   • aspirin 81 MG EC tablet Take 1 tablet by mouth Daily.   10/10/2019   • NON FORMULARY 1 each Daily. CBD gel cap, 25mg daily   10/10/2019       History:   Past Medical History:   Diagnosis Date   • Arthritis    • Clostridium difficile infection 2018   • Depression    • GERD (gastroesophageal reflux disease)    • High cholesterol    • Hypertension    • Interstitial cystitis    • LBBB (left bundle branch block)    • Neck pain    • OAB (overactive bladder)    • Squamous cell carcinoma in situ     REMOVED FROM BOTH SHOULDERS, LUE, RLE, AND NOSE    • TIA (transient ischemic attack)     NO RESIDUAL PROBLEMS PER ANXIETY   • Wears reading eyeglasses      Past Surgical History:   Procedure Laterality Date   • ANTERIOR CERVICAL DISCECTOMY W/ FUSION N/A 2017    Procedure: CERVICAL DISCECTOMY ANTERIOR WITH FUSION C6-7 CAGE PLATE ALLOGRAFT;  Surgeon: Sanket Murrell MD;  Location: [facility];  Service:    • BLADDER SUSPENSION     • CATARACT EXTRACTION     • COLONOSCOPY     • ENDOSCOPY     • FINGER SURGERY Right     THIRD AND FOURTH FINGER RIGHT HAND    • HYSTERECTOMY  1986     History reviewed. No pertinent family history.  Social History     Tobacco Use   • Smoking status: Former Smoker     Packs/day: 0.50     Years: 30.00     Pack years: 15.00     Types: Cigarettes     Last attempt to quit: 2015     Years since quittin.7   • Smokeless tobacco: Never Used   Substance Use Topics   • Alcohol use: No   • Drug use: No   She is   with no children. She is retired from office work.    Review of Systems  Pertinent items are noted in HPI, all other systems reviewed and negative    Vital Signs  Visit Vitals  /76   Pulse 71   Temp 98.2 °F (36.8 °C) (Temporal)   Resp 16   SpO2 97%       Physical Exam:    General Appearance:    Alert, cooperative, in no acute distress   Head:    Normocephalic, without obvious abnormality, atraumatic   Eyes:            Lids and lashes normal, conjunctivae and sclerae normal, no   icterus, no pallor, corneas clear   Ears:    Ears appear intact with no abnormalities noted   Neck:   No adenopathy, supple, trachea midline, no thyromegaly   Lungs:     Clear to auscultation, respirations regular, even and                   unlabored    Heart:    Regular rhythm and normal rate, normal S1 and S2, no            murmur, no gallop   Abdomen:     Normal bowel sounds, no masses, no organomegaly, soft        non-tender, non-distended, no guarding, no rebound                 tenderness   Genitalia:    Deferred   Extremities:   Left knee ACE wrap CDI. Nerve block present   Pulses:   Pulses palpable and equal bilaterally   Skin:   No bleeding, bruising or rash   Neurologic:   Cranial nerves 2 - 12 grossly intact, sensation intact. Flexion and dorsiflexion intact bilateral feet.        I reviewed the patient's new clinical results.     Results for KAROL BURGESS (MRN [id number]) as of 10/17/2019 11:36   Ref. Range 10/8/2019 12:30   Glucose Latest Ref Range: 65 - 99 mg/dL 100 (H)   Sodium Latest Ref Range: 136 - 145 mmol/L 141   Potassium Latest Ref Range: 3.5 - 5.2 mmol/L 4.4   CO2 Latest Ref Range: 22.0 - 29.0 mmol/L 27.0   Chloride Latest Ref Range: 98 - 107 mmol/L 104   Anion Gap Latest Ref Range: 5.0 - 15.0 mmol/L 10.0   Creatinine Latest Ref Range: 0.57 - 1.00 mg/dL 0.62   BUN Latest Ref Range: 8 - 23 mg/dL 11   BUN/Creatinine Ratio Latest Ref Range: 7.0 - 25.0  17.7   Calcium Latest Ref Range: 8.6 - 10.5 mg/dL 9.4    eGFR Non  Am Latest Ref Range: >60 mL/min/1.73 97   Hemoglobin A1C Latest Ref Range: 4.80 - 5.60 % 5.10   WBC Latest Ref Range: 3.40 - 10.80 10*3/mm3 6.05   RBC Latest Ref Range: 3.77 - 5.28 10*6/mm3 4.19   Hemoglobin Latest Ref Range: 12.0 - 15.9 g/dL 12.6   Hematocrit Latest Ref Range: 34.0 - 46.6 % 39.6   RDW Latest Ref Range: 12.3 - 15.4 % 12.2 (L)   MCV Latest Ref Range: 79.0 - 97.0 fL 94.5   MCH Latest Ref Range: 26.6 - 33.0 pg 30.1   MCHC Latest Ref Range: 31.5 - 35.7 g/dL 31.8   MPV Latest Ref Range: 6.0 - 12.0 fL 10.1   Platelets Latest Ref Range: 140 - 450 10*3/mm3 240     Assessment and Plan:     Status post total left knee replacement    Primary osteoarthritis of left knee    Hyperlipidemia    HTN (hypertension)      Plan  1. PT/OT- early ambulation postop  2. Pain control-prns, AC nerve block   3. IS-encourage  4. DVT proph- mechs/ASA  5. Bowel regimen  6. Resume home medications as appropriate  7. Monitor post-op labs  8. Discharge planning for home    HTN, HLD  - Continue home lopressor and statin  - Monitor BP   - Holding parameters for BP meds  - Labetalol PRN for SBP>170    History of C. difficile infection.  -Discussed with patient taking probiotics for couple weeks following surgery due to perioperative prophylactic antibiotic.  Also discussed with her holding her Dexilant for 1 to 2 weeks afterwards as well.RAJ Tripp  10/17/19  3:21 PM     I have personally performed the evaluation on this patient. My history is consistent  with HPI obtained. My exam findings are listed above. I have personally reviewed and discussed the above formulated treatment plan with pt and BETZAIDA.Anusha.      Electronically signed by Michael Haney MD at 10/17/19 2031     Milena Delgadillo APRN at 10/17/19 0898     Attestation signed by Grant Diez MD at 10/17/19 0971    H&P reviewed, agree w plan                  Pre-Op H&P  Ирина Gutierrez  [id number]  1955    Chief  complaint: left knee pain.    HPI:    Patient is a 64 y.o.female who presents with left knee pain. Patient sitting up in bed with nurse at bedside. States she has had increased knee pain but no other changes. No CP, or SOB. Patient stable and ready to undergo left total knee replacement today.    Review of Systems:  General ROS: negative for chills, fever or skin lesions;  No changes since last office visit  Cardiovascular ROS: no chest pain or dyspnea on exertion  Respiratory ROS: no cough, shortness of breath, or wheezing    Allergies:   Allergies   Allergen Reactions   • Augmentin [Amoxicillin-Pot Clavulanate] Nausea Only   • Bactrim [Sulfamethoxazole-Trimethoprim] Rash       Home Meds:    No current facility-administered medications on file prior to encounter.      Current Outpatient Medications on File Prior to Encounter   Medication Sig Dispense Refill   • FLUoxetine (PROzac) 20 MG capsule Take 20 mg by mouth Daily.     • HYDROcodone-acetaminophen (NORCO) 7.5-325 MG per tablet Take 1 tablet by mouth Every 4 (Four) Hours As Needed for Moderate Pain  for up to 30 doses. (Patient taking differently: Take 1 tablet by mouth Every 6 (Six) Hours As Needed for Moderate Pain .) 30 tablet 0   • levocetirizine (XYZAL) 5 MG tablet Take 5 mg by mouth Every Evening.     • metoprolol tartrate (LOPRESSOR) 25 MG tablet Take 25 mg by mouth 2 (Two) Times a Day.     • pentosan polysulfate (ELMIRON) 100 MG capsule Take 100 mg by mouth 2 (Two) Times a Day.     • pravastatin (PRAVACHOL) 20 MG tablet Take 20 mg by mouth Daily.     • temazepam (RESTORIL) 30 MG capsule Take 30 mg by mouth At Night As Needed for Sleep.     • aspirin 81 MG EC tablet Take 1 tablet by mouth Daily.         PMH:   Past Medical History:   Diagnosis Date   • Arthritis    • Clostridium difficile infection 07/2018   • Depression    • GERD (gastroesophageal reflux disease)    • High cholesterol    • Hypertension    • Interstitial cystitis    • LBBB (left bundle  branch block)    • Neck pain    • OAB (overactive bladder)    • Squamous cell carcinoma in situ     REMOVED FROM BOTH SHOULDERS, LUE, RLE, AND NOSE    • TIA (transient ischemic attack)     NO RESIDUAL PROBLEMS PER ANXIETY   • Wears reading eyeglasses      PSH:    Past Surgical History:   Procedure Laterality Date   • ANTERIOR CERVICAL DISCECTOMY W/ FUSION N/A 2017    Procedure: CERVICAL DISCECTOMY ANTERIOR WITH FUSION C6-7 CAGE PLATE ALLOGRAFT;  Surgeon: Sanket Murrell MD;  Location: [facility];  Service:    • BLADDER SUSPENSION     • CATARACT EXTRACTION     • COLONOSCOPY     • ENDOSCOPY     • FINGER SURGERY Right     THIRD AND FOURTH FINGER RIGHT HAND    • HYSTERECTOMY           Social History:   Tobacco:   Social History     Tobacco Use   Smoking Status Former Smoker   • Packs/day: 0.50   • Years: 30.00   • Pack years: 15.00   • Types: Cigarettes   • Last attempt to quit:    • Years since quittin.7   Smokeless Tobacco Never Used      Alcohol:     Social History     Substance and Sexual Activity   Alcohol Use No       Vitals:  /68 (BP Location: Right arm, Patient Position: Lying)   Pulse 76   Temp 97.8 °F (36.6 °C) (Temporal)   Resp 18   SpO2 97%     Physical Exam:  General Appearance:    Alert, cooperative, no distress, appears stated age, pleasant   Head:    Normocephalic, without obvious abnormality, atraumatic   Lungs:     Clear to auscultation bilaterally, respirations unlabored    Heart:   Regular rate and rhythm, S1 and S2 normal, no murmur, rub    or gallop    Abdomen:    Soft, non-tender.  +bowel sounds   Breast Exam:    deferred   Genitalia:    deferred   Extremities:   Extremities normal, atraumatic, no cyanosis or edema   Skin:   Skin color, texture, turgor normal, no rashes or lesions   Neurologic:   Grossly intact, POTTER   Results Review  LABS:  Lab Results   Component Value Date    WBC 6.05 10/08/2019    HGB 12.6 10/08/2019    HCT 39.6 10/08/2019    MCV 94.5  10/08/2019     10/08/2019    GLUCOSE 100 (H) 10/08/2019    BUN 11 10/08/2019    CREATININE 0.62 10/08/2019    EGFRIFNONA 97 10/08/2019     10/08/2019    K 4.4 10/08/2019     10/08/2019    CO2 27.0 10/08/2019    CALCIUM 9.4 10/08/2019       RADIOLOGY:  Imaging Results (last 72 hours)     ** No results found for the last 72 hours. **        I reviewed the patient's new clinical results.    Cancer Staging (if applicable)  Cancer Patient: __ yes _x_no __unknown; If yes, clinical stage T:__ N:__M:__, stage group or __N/A    Impression/Plan: Diagnosis left knee osteoarthritis. Left total knee replacement today.    RAJ Gomez Student   10/17/2019   8:25 AM    Electronically signed by Grant Diez MD at 10/17/19 0919

## 2019-10-18 NOTE — PROGRESS NOTES
"/63 (BP Location: Left arm, Patient Position: Lying)   Pulse 70   Temp 97.7 °F (36.5 °C) (Oral)   Resp 16   Ht 162.6 cm (64\")   Wt 93 kg (205 lb)   SpO2 97%   BMI 35.19 kg/m²     Lab Results (last 24 hours)     ** No results found for the last 24 hours. **          Imaging Results (last 24 hours)     Procedure Component Value Units Date/Time    XR Knee 1 or 2 View Left [[id number]] Collected:  10/17/19 1243     Updated:  10/17/19 2157    Narrative:       EXAMINATION: XR KNEE 1 OR 2 VW, LEFT-10/17/2019:      INDICATION: Post-Op Knee Arthoplasty.      COMPARISON: NONE.     FINDINGS: The bones of the left knee joint appear anatomically aligned  and intact. No fracture is identified. No abnormal lucency is seen  around the prosthetic components. There is expected postop soft tissue  air.           Impression:       Knee joint prosthesis in anatomic alignment. No acute bony  abnormality is identified.     D:  10/17/2019  E:  10/17/2019     This report was finalized on 10/17/2019 9:54 PM by DR. León Rojas MD.             Patient Care Team:  Preston Grant MD as PCP - General (Family Medicine)  Elijah Garcia MD as Consulting Physician (Interventional Cardiology)  Raudel Horn MD as Consulting Physician (Urology)    SUBJECTIVE: She is doing well.  Pain is well controlled.  She walked 16 feet with therapy yesterday.    PHYSICAL EXAM  Left knee incision is clean, dry and intact with mesh dressing in place   thigh and calf soft and nontender  Neurovascular intact distally         Status post total left knee replacement    Hyperlipidemia    Primary osteoarthritis of left knee    HTN (hypertension)      PLAN / DISPOSITION: 64-year-old female postop day #1 status post left total knee arthroplasty   -continue to mobilize with therapy as tolerated  -Aspirin for DVT prophylaxis  -Okay to shower with mesh dressing in place once pain catheter removed  -Plan discharge home later today or tomorrow " with home physical therapy, transitioning to outpatient PT in [place].  -Follow-up in 2 weeks      Grant Diez MD  10/18/19  7:30 AM

## 2019-10-18 NOTE — PLAN OF CARE
Problem: Patient Care Overview  Goal: Plan of Care Review  Outcome: Outcome(s) achieved Date Met: 10/18/19   10/18/19 1313   Coping/Psychosocial   Plan of Care Reviewed With patient   OTHER   Outcome Summary Pt s/p TKA and now with improved pain and overall tolerance. Issued leg  for improved independence in/OOB, pt benefits from compensatory strategies for LBD. Education provided regarding nerve cath management home safety. Plan for home with .       Problem: Knee Arthroplasty (Total, Partial) (Adult)  Goal: Signs and Symptoms of Listed Potential Problems Will be Absent, Minimized or Managed (Knee Arthroplasty)  Outcome: Outcome(s) achieved Date Met: 10/18/19   10/18/19 1313   Goal/Outcome Evaluation   Problems Assessed (Knee Arthroplasty) functional deficit   Problems Present (Knee Arthroplasty) functional deficit

## 2019-10-18 NOTE — THERAPY DISCHARGE NOTE
Acute Care - Occupational Therapy Initial Eval/Discharge   [facility]     Patient Name: Ирина Gutierrez  : 1955  MRN: [id number]  Today's Date: 10/18/2019     Date of Referral to OT: 10/18/19  Referring Physician: MD Rg      Admit Date: 10/17/2019       ICD-10-CM ICD-9-CM   1. Status post total left knee replacement Z96.652 V43.65   2. Impaired mobility and ADLs Z74.09 799.89     Patient Active Problem List   Diagnosis   • Herniated disc, cervical, s/p ACDF   • Hyperlipidemia   • Primary osteoarthritis of left knee   • Status post total left knee replacement   • HTN (hypertension)     Past Medical History:   Diagnosis Date   • Arthritis    • Clostridium difficile infection 2018   • Depression    • GERD (gastroesophageal reflux disease)    • High cholesterol    • Hypertension    • Interstitial cystitis    • LBBB (left bundle branch block)    • Neck pain    • OAB (overactive bladder)    • Squamous cell carcinoma in situ     REMOVED FROM BOTH SHOULDERS, LUE, RLE, AND NOSE    • TIA (transient ischemic attack)     NO RESIDUAL PROBLEMS PER ANXIETY   • Wears reading eyeglasses      Past Surgical History:   Procedure Laterality Date   • ANTERIOR CERVICAL DISCECTOMY W/ FUSION N/A 2017    Procedure: CERVICAL DISCECTOMY ANTERIOR WITH FUSION C6-7 CAGE PLATE ALLOGRAFT;  Surgeon: Sanket Murrell MD;  Location:  [email or web address] OR;  Service:    • BLADDER SUSPENSION     • CATARACT EXTRACTION     • COLONOSCOPY     • ENDOSCOPY     • FINGER SURGERY Right     THIRD AND FOURTH FINGER RIGHT HAND    • HYSTERECTOMY     • TOTAL KNEE ARTHROPLASTY Left 10/17/2019    Procedure: TOTAL KNEE REPLACEMENT LEFT;  Surgeon: Grant Diez MD;  Location:  [email or web address] OR;  Service: Orthopedics          OT ASSESSMENT FLOWSHEET (last 12 hours)      Occupational Therapy Evaluation     Row Name 10/18/19 1313                   OT Evaluation Time/Intention    Subjective Information  no complaints  -ST        Document Type   evaluation;therapy note (daily note);discharge evaluation/summary  -ST        Mode of Treatment  individual therapy;occupational therapy  -ST        Patient Effort  good  -ST        Symptoms Noted During/After Treatment  none  -ST           General Information    Patient Profile Reviewed?  yes  -ST        Referring Physician  MD Rg  -ST        Patient Observations  alert;cooperative;agree to therapy  -ST        Patient/Family Observations  no family present  -ST        General Observations of Patient  Pt supine at arrival; nerve cath intact  -ST        Prior Level of Function  independent:;all household mobility;transfer;bed mobility;feeding;grooming;min assist:;community mobility;dressing;bathing;home management;cooking inc. pain and trouble with ADLs and mobility  -ST        Pertinent History of Current Functional Problem  Pt presents for sx management of long-standing L knee pain and dysfunction that failed to improve w/conservative measures and impacted pt's ability to perform ADLs and mobility. Pt now POD 1 and s/p L TKA   -ST        Existing Precautions/Restrictions  fall;other (see comments) L AC cath  -ST        Equipment Issued to Patient  leg   -ST        Risks Reviewed  patient:;LOB;nausea/vomiting;dizziness;increased discomfort  -ST        Benefits Reviewed  patient:;improve function;increase independence;increase strength;increase balance;decrease pain;increase knowledge  -ST        Barriers to Rehab  previous functional deficit  -ST           Relationship/Environment    Primary Source of Support/Comfort  spouse  -ST        Lives With  spouse  -ST        Family Caregiver if Needed  spouse  -ST        Concerns About Impact on Relationships   to assist   -ST           Resource/Environmental Concerns    Current Living Arrangements  home/apartment/condo  -ST           Cognitive Assessment/Interventions    Additional Documentation  Cognitive Assessment/Intervention (Group)  -ST            Cognitive Assessment/Intervention- PT/OT    Affect/Mental Status (Cognitive)  WNL  -ST        Orientation Status (Cognition)  oriented x 4  -ST        Follows Commands (Cognition)  WNL  -ST        Cognitive Function (Cognitive)  WNL  -ST           Safety Issues, Functional Mobility    Impairments Affecting Function (Mobility)  pain;range of motion (ROM)  -ST           Mobility Assessment/Treatment    Extremity Weight-bearing Status  left lower extremity  -ST        Left Lower Extremity (Weight-bearing Status)  weight-bearing as tolerated (WBAT)  -ST           Bed Mobility Assessment/Treatment    Bed Mobility Assessment/Treatment  supine-sit;sit-supine  -ST        Supine-Sit [place] (Bed Mobility)  conditional independence  -ST        Sit-Supine [place] (Bed Mobility)  conditional independence  -ST        Bed Mobility, Safety Issues  decreased use of legs for bridging/pushing  -ST        Assistive Device (Bed Mobility)  head of bed elevated;leg   -ST        Comment (Bed Mobility)  issued leg  for increased comfort and independence   -ST           Transfer Assessment/Treatment    Transfer Assessment/Treatment  sit-stand transfer;stand-sit transfer  -ST        Comment (Transfers)  education for proper standing balance/safety with pulling up pants and adjusting nerve cath   -ST           Sit-Stand Transfer    Sit-Stand [place] (Transfers)  supervision  -ST        Assistive Device (Sit-Stand Transfers)  walker, front-wheeled  -ST           Stand-Sit Transfer    Stand-Sit [place] (Transfers)  supervision  -ST        Assistive Device (Stand-Sit Transfers)  walker, front-wheeled  -ST           ADL Assessment/Intervention    52698 - OT Self Care/Mgmt Minutes  17  -ST        BADL Assessment/Intervention  bathing;lower body dressing  -ST           Bathing Assessment/Intervention    Bathing [place] Level  lower body;distal lower extremities/feet;set up  -ST        Bathing Position  edge of  bed sitting  -ST        Comment (Bathing)  simulated task with use of AE to improve independence   -ST           Lower Body Dressing Assessment/Training    Lower Body Dressing [place] Level  doff;don;socks;pants/bottoms;set up  -ST        Assistive Devices (Lower Body Dressing)  reacher;sock-aid  -ST        Lower Body Dressing Position  edge of bed sitting  -ST        Comment (Lower Body Dressing)  practiced with good teach-back; has all AE for home   -ST           BADL Safety/Performance    Impairments, BADL Safety/Performance  pain;range of motion  -ST        Skilled BADL Treatment/Intervention  adaptive equipment training;compensatory training  -ST        Progress in BADL Status  improvement noted;independence level  -ST           General ROM    GENERAL ROM COMMENTS  BUE's WFL for ADLs  -ST           MMT (Manual Muscle Testing)    General MMT Comments  BU's WFL for ADLs  -ST           Motor Assessment/Interventions    Additional Documentation  Balance (Group)  -ST           Static Sitting Balance    Level of [place] (Unsupported Sitting, Static Balance)  independent  -ST        Sitting Position (Unsupported Sitting, Static Balance)  sitting on edge of bed  -ST        Time Able to Maintain Position (Unsupported Sitting, Static Balance)  more than 5 minutes  -ST           Static Standing Balance    Level of [place] (Supported Standing, Static Balance)  contact guard assist  -ST        Assistive Device Utilized (Supported Standing, Static Balance)  walker, rolling  -ST           Sensory Assessment/Intervention    Sensory General Assessment  no sensation deficits identified  -ST           Positioning and Restraints    Pre-Treatment Position  in bed  -ST        Post Treatment Position  bed  -ST        In Bed  notified nsg;supine;call light within reach;encouraged to call for assist;exit alarm on  -ST           Pain Scale: Numbers Pre/Post-Treatment    Pain Scale: Numbers, Pretreatment  3/10  -ST         Pain Scale: Numbers, Post-Treatment  3/10  -ST        Pain Location - Side  Left  -ST        Pain Location - Orientation  anterior  -ST        Pain Location  knee  -ST        Pain Intervention(s)  Cold applied;Repositioned  -ST           Wound 10/17/19 1059 Left knee Incision    Wound - Properties Group Date first assessed: 10/17/19  -RV Time first assessed: 1059  -RV Side: Left  -RV Location: knee  -RV Primary Wound Type: Incision  -RV       Clinical Impression (OT)    Date of Referral to OT  10/18/19  -ST        OT Diagnosis  impaired ADLs  -ST        Prognosis (OT Eval)  good  -ST        Patient/Family Goals Statement (OT Eval)  return home  -ST        Therapy Frequency (OT Eval)  evaluation only  -ST        Care Plan Review (OT)  evaluation/treatment results reviewed;care plan/treatment goals reviewed;risks/benefits reviewed;current/potential barriers reviewed;patient/other agree to care plan  -ST        Anticipated Discharge Disposition (OT)  home with home health;home with 24/7 care;home with OP services  -ST           Discharge Summary (Occupational Therapy)    Additional Documentation  Discharge Summary, OT Eval (Group)  -ST           Discharge Summary, OT Eval    Reason for Discharge (OT Discharge Summary)  patient discharged from this facility  -ST        Outcomes Achieved Upon Discharge (OT Discharge Summary)  discharge from facility occurred on same date as evaluation  -ST           Living Environment    Home Accessibility  -- walk-in shower  -ST          User Key  (r) = Recorded By, (t) = Taken By, (c) = Cosigned By    Initials Name Effective Dates    Lanette Oliveira OTR 06/10/18 -     cA Jaffe RN 06/16/16 -           Occupational Therapy Education     Title: PT OT SLP Therapies (Done)     Topic: Occupational Therapy (Done)     Point: ADL training (Done)     Description: Instruct learner(s) on proper safety adaptation and remediation techniques during self care or transfers.    Instruct in proper use of assistive devices.    Learning Progress Summary           Patient Acceptance, E,TB,D, VU,DU by  at 10/18/2019  1:13 PM    Comment:  see flow sheet                   Point: Home exercise program (Done)     Description: Instruct learner(s) on appropriate technique for monitoring, assisting and/or progressing therapeutic exercises/activities.    Learning Progress Summary           Patient Acceptance, E,TB,D, VU,DU by  at 10/18/2019  1:13 PM    Comment:  see flow sheet                   Point: Precautions (Done)     Description: Instruct learner(s) on prescribed precautions during self-care and functional transfers.    Learning Progress Summary           Patient Acceptance, E,TB,D, VU,DU by  at 10/18/2019  1:13 PM    Comment:  see flow sheet                   Point: Body mechanics (Done)     Description: Instruct learner(s) on proper positioning and spine alignment during self-care, functional mobility activities and/or exercises.    Learning Progress Summary           Patient Acceptance, E,TB,D, VU,DU by  at 10/18/2019  1:13 PM    Comment:  see flow sheet                               User Key     Initials Effective Dates Name Provider Type Discipline     06/10/18 -  Lanette Reese OTR Occupational Therapist OT                OT Recommendation and Plan  Outcome Summary/Treatment Plan (OT)  Anticipated Discharge Disposition (OT): home with home health, home with 24/7 care, home with OP services  Reason for Discharge (OT Discharge Summary): patient discharged from this facility  Therapy Frequency (OT Eval): evaluation only  Plan of Care Review  Plan of Care Reviewed With: patient  Plan of Care Reviewed With: patient  Outcome Summary: Pt s/p TKA and now with improved pain and overall tolerance. Issued leg  for improved independence in/OOB, pt benefits from compensatory strategies for LBD. Education provided regarding nerve cath management home safety. Plan for home with  janet.     Rehab Goal Summary     Row Name 10/18/19 0903             Bed Mobility Goal 1 (PT)    Activity/Assistive Device (Bed Mobility Goal 1, PT)  bed mobility activities, all  -CHANDANA      [place] Level/Cues Needed (Bed Mobility Goal 1, PT)  conditional independence  -CHANDANA      Time Frame (Bed Mobility Goal 1, PT)  long term goal (LTG);3 days  -CHANDANA      Progress/Outcomes (Bed Mobility Goal 1, PT)  goal not met;discharged from facility  -         Transfer Goal 1 (PT)    Activity/Assistive Device (Transfer Goal 1, PT)  sit-to-stand/stand-to-sit;bed-to-chair/chair-to-bed  -CHANDANA      [place] Level/Cues Needed (Transfer Goal 1, PT)  conditional independence  -CHANDANA      Time Frame (Transfer Goal 1, PT)  long term goal (LTG);3 days  -CHANDANA      Progress/Outcome (Transfer Goal 1, PT)  goal not met;discharged from [facility]  -         Gait Training Goal 1 (PT)    Activity/Assistive Device (Gait Training Goal 1, PT)  gait (walking locomotion);walker, rolling  -CHANDANA      [place] Level (Gait Training Goal 1, PT)  supervision required  -CHANDANA      Time Frame (Gait Training Goal 1, PT)  long term goal (LTG);3 days  -CHANDANA      Barriers (Gait Training Goal 1, PT)  150'  -CHANDANA      Progress/Outcome (Gait Training Goal 1, PT)  goal partially met;discharged from [facility]  -         ROM Goal 1 (PT)    ROM Goal 1 (PT)  0-90 deg knee AAROM  -CHANDANA      Time Frame (ROM Goal 1, PT)  long term goal (LTG);3 days  -CHANDANA      Progress/Outcome (ROM Goal 1, PT)  goal not met;discharged from [facility]  -         Stairs Goal 1 (PT)    Activity/Assistive Device (Stairs Goal 1, PT)  stairs, all skills;walker, rolling  -CHANDANA      [place] Level/Cues Needed (Stairs Goal 1, PT)  standby assist  -CHANDANA      Number of Stairs (Stairs Goal 1, PT)  1  -CHANDANA      Time Frame (Stairs Goal 1, PT)  long term goal (LTG);3 days  -CHANDANA      Progress/Outcome (Stairs Goal 1, PT)  goal partially met;discharged from [facility]  -        User Key  (r) = Recorded By, (t) =  Taken By, (c) = Cosigned By    Initials Name Provider Type Discipline    Abelino Santos, PT Physical Therapist PT          Outcome Measures     Row Name 10/18/19 1313             How much help from another is currently needed...    Putting on and taking off regular lower body clothing?  3  -ST      Bathing (including washing, rinsing, and drying)  3  -ST      Toileting (which includes using toilet bed pan or urinal)  4  -ST      Putting on and taking off regular upper body clothing  4  -ST      Taking care of personal grooming (such as brushing teeth)  3  -ST      Eating meals  4  -ST      AM-PAC 6 Clicks Score (OT)  21  -ST         Functional Assessment    Outcome Measure Options  AM-PAC 6 Clicks Daily Activity (OT)  -ST        User Key  (r) = Recorded By, (t) = Taken By, (c) = Cosigned By    Initials Name Provider Type    Lanette Oliveira OTR Occupational Therapist          Time Calculation:   Time Calculation- OT     Row Name 10/18/19 1313 10/18/19 0903          Time Calculation- OT    OT Start Time  1313 -ST  --     OT Received On  10/18/19  -ST  --        Timed Charges    79928 - Gait Training Minutes   --  17  -JP     87083 - OT Self Care/Mgmt Minutes  17 -ST  --       User Key  (r) = Recorded By, (t) = Taken By, (c) = Cosigned By    Initials Name Provider Type    Lanette Oliveira OTR Occupational Therapist    Abelino Santos, PT Physical Therapist        Therapy Suggested Charges     Code   Minutes Charges    48119 (CPT®) Hc Ot Neuromusc Re Education Ea 15 Min      31933 (CPT®) Hc Ot Ther Proc Ea 15 Min      98638 (CPT®) Hc Ot Therapeutic Act Ea 15 Min      27969 (CPT®) Hc Ot Manual Therapy Ea 15 Min      83346 (CPT®) Hc Ot Iontophoresis Ea 15 Min      55016 (CPT®) Hc Ot Elec Stim Ea-Per 15 Min      20586 (CPT®) Hc Ot Ultrasound Ea 15 Min      51915 (CPT®) Hc Ot Self Care/Mgmt/Train Ea 15 Min 17 1    Total  17 1        Therapy Charges for Today     Code Description Service Date Service Provider  Modifiers Qty    54792906550 HC OT SELF CARE/MGMT/TRAIN EA 15 MIN 10/18/2019 Lanette Reese OTR GO 1    [id number] HC OT EVAL LOW COMPLEXITY 2 10/18/2019 Lanette Reese OTR GO 1               OT Discharge Summary  Anticipated Discharge Disposition (OT): home with home health, home with 24/7 care, home with OP services  Reason for Discharge: Discharge from facility  Outcomes Achieved: Discharge from facility occurred on same date as evluation  Discharge Destination: Home with assist, Home with home health, Home with outpatient services    LIZ Nguyen  10/18/2019

## 2019-10-18 NOTE — PROGRESS NOTES
Discharge Planning Assessment  [facility]     Patient Name: Ирина Gutierrez  MRN: [id number]  Today's Date: 10/18/2019    Admit Date: 10/17/2019    Discharge Needs Assessment     Row Name 10/18/19 1043       Living Environment    Lives With  spouse    Current Living Arrangements  home/apartment/condo    Primary Care Provided by  self    Provides Primary Care For  no one    Family Caregiver if Needed  spouse    Able to Return to Prior Arrangements  yes       Transition Planning    Patient/Family Anticipates Transition to  home    Transportation Anticipated  family or friend will provide       Discharge Needs Assessment    Equipment Currently Used at Home  walker, rolling;commode    Offered/Gave Vendor List  yes        Discharge Plan     Row Name 10/18/19 1044       Plan    Plan  Home    Patient/Family in Agreement with Plan  yes    Plan Comments  Spoke with patient at bedside. She lives with her  in a one story in [facility]. PTA she was independent with ADL's and mobility, she has a RW at home for use at dc. Per her request a referral has been made to Rubi fierro/  [facility] for PT. Rubi was notified of patient's pending dc today. No other needs noted at this time. CM following.     Final Discharge Disposition Code  06 - home with home health care        Destination      No service coordination in this encounter.      Durable Medical Equipment      No service coordination in this encounter.      Dialysis/Infusion      No service coordination in this encounter.      Home Medical Care - Selection Complete      Service Provider Request Status Selected Services Address Phone Number Fax Number    [facility] Selected Home Health Services [address] [place] [id number] [phone]      Therapy      No service coordination in this encounter.      Community Resources      No service coordination in this encounter.          Demographic Summary     Row Name  10/18/19 1040       General Information    Arrived From  home    Reason for Consult  discharge planning        Functional Status     Row Name 10/18/19 1043       Functional Status    Usual Activity Tolerance  good    Current Activity Tolerance  moderate        Psychosocial    No documentation.       Abuse/Neglect    No documentation.       Legal    No documentation.       Substance Abuse    No documentation.       Patient Forms    No documentation.           Deborah Funes RN

## 2019-10-18 NOTE — PROGRESS NOTES
Secretary    Acute pain service Inpatient Progress Note    Patient Name: Ирина Gutierrez  :  1955  MRN:  [id number]        Acute Pain  Service Inpatient Progress Note:    Analgesia:Good  Pain Score:3/10  LOC: alert and awake  Side Effects:None  Catheter Site:clean, dry and dressing intact  Cath type: peripheral nerve cath with ON Q  Infusion rate: 10ml/hr  Catheter Plan:Catheter to remain Insitu and Continue catheter infusion rate unchanged

## 2019-10-18 NOTE — PLAN OF CARE
Problem: Patient Care Overview  Goal: Plan of Care Review  Outcome: Ongoing (interventions implemented as appropriate)   10/18/19 0903   Coping/Psychosocial   Plan of Care Reviewed With patient   Plan of Care Review   Progress improving   OTHER   Outcome Summary Pt increased ambulation distance to 160 feet with CGA x1 and front-wheeled walker. Pt also ascended/descended 1 step using backwards technique with CGA x1 and front-wheeled walker. Gait/stair training limited by pain and fatigue. Pt L knee ROM measured at 2-85 degrees. Anticipate pt d/c home with assist and HHPT.        Problem: Knee Arthroplasty (Total, Partial) (Adult)  Goal: Signs and Symptoms of Listed Potential Problems Will be Absent, Minimized or Managed (Knee Arthroplasty)  Outcome: Ongoing (interventions implemented as appropriate)   10/18/19 0903   Goal/Outcome Evaluation   Problems Assessed (Knee Arthroplasty) functional deficit;pain;range of motion decreased   Problems Present (Knee Arthroplasty) functional deficit;pain;range of motion decreased

## 2019-10-18 NOTE — THERAPY DISCHARGE NOTE
Patient Name: Ирина Gutierrez  : 1955    MRN: [id number]                              Today's Date: 10/18/2019       Admit Date: 10/17/2019    Visit Dx:     ICD-10-CM ICD-9-CM   1. Status post total left knee replacement Z96.652 V43.65     Patient Active Problem List   Diagnosis   • Herniated disc, cervical, s/p ACDF   • Hyperlipidemia   • Primary osteoarthritis of left knee   • Status post total left knee replacement   • HTN (hypertension)     Past Medical History:   Diagnosis Date   • Arthritis    • Clostridium difficile infection 2018   • Depression    • GERD (gastroesophageal reflux disease)    • High cholesterol    • Hypertension    • Interstitial cystitis    • LBBB (left bundle branch block)    • Neck pain    • OAB (overactive bladder)    • Squamous cell carcinoma in situ     REMOVED FROM BOTH SHOULDERS, LUE, RLE, AND NOSE    • TIA (transient ischemic attack)     NO RESIDUAL PROBLEMS PER ANXIETY   • Wears reading eyeglasses      Past Surgical History:   Procedure Laterality Date   • ANTERIOR CERVICAL DISCECTOMY W/ FUSION N/A 2017    Procedure: CERVICAL DISCECTOMY ANTERIOR WITH FUSION C6-7 CAGE PLATE ALLOGRAFT;  Surgeon: Sanket Murrell MD;  Location:  [facility];  Service:    • BLADDER SUSPENSION     • CATARACT EXTRACTION     • COLONOSCOPY     • ENDOSCOPY     • FINGER SURGERY Right     THIRD AND FOURTH FINGER RIGHT HAND    • HYSTERECTOMY     • TOTAL KNEE ARTHROPLASTY Left 10/17/2019    Procedure: TOTAL KNEE REPLACEMENT LEFT;  Surgeon: Grant Diez MD;  Location:  [facility];  Service: Orthopedics     General Information     Row Name 10/18/19 0903          PT Evaluation Time/Intention    Document Type  therapy note (daily note)  -CHANDANA     Mode of Treatment  individual therapy;physical therapy  -CHANDANA     Row Name 10/18/19 0903          General Information    Patient Profile Reviewed?  yes  -CHANDANA     Existing Precautions/Restrictions  fall;other (see comments) L adductor  nerve catheter  -     Row Name 10/18/19 0903          Cognitive Assessment/Intervention- PT/OT    Orientation Status (Cognition)  oriented x 4  -     Row Name 10/18/19 0903          Safety Issues, Functional Mobility    Impairments Affecting Function (Mobility)  endurance/activity tolerance;pain;range of motion (ROM);strength;balance  -       User Key  (r) = Recorded By, (t) = Taken By, (c) = Cosigned By    Initials Name Provider Type    CHANDANA Abelino Chicas, PT Physical Therapist        Mobility     Row Name 10/18/19 0903          Bed Mobility Assessment/Treatment    Bed Mobility Assessment/Treatment  supine-sit  -CHANDANA     Supine-Sit [place] (Bed Mobility)  verbal cues;supervision  -     Assistive Device (Bed Mobility)  head of bed elevated;bed rails  -     Comment (Bed Mobility)  Verbal cues for sequencing of LE to EOB and use of bed rails to assist to sit  -     Row Name 10/18/19 0903          Transfer Assessment/Treatment    Comment (Transfers)  Verbal cues to use UEs to push from bed to stand and reaching back with UEs when lowering to sit. Verbal cues for moving L LE out for comfort prior to sitting.   -     Row Name 10/18/19 0903          Sit-Stand Transfer    Sit-Stand [place] (Transfers)  contact guard;verbal cues  -CHANDANA     Assistive Device (Sit-Stand Transfers)  walker, front-wheeled  -     Row Name 10/18/19 0903          Gait/Stairs Assessment/Training    [place] Level (Gait)  contact guard;verbal cues  -CHANDANA     Assistive Device (Gait)  walker, front-wheeled  -     Distance in Feet (Gait)  160 feet  -     Pattern (Gait)  step-through  -CHANDANA     Deviations/Abnormal Patterns (Gait)  bilateral deviations;patrick decreased;gait speed decreased;stride length decreased  -CHANDANA     Bilateral Gait Deviations  forward flexed posture;heel strike decreased  -CHANDANA     Left Sided Gait Deviations  heel strike decreased;weight shift ability decreased  -     [place] Level (Stairs)  contact  guard;verbal cues Backwards technique  -CHANDANA     Assistive Device (Stairs)  walker, front-wheeled  -CHANDANA     Handrail Location (Stairs)  none  -CHANDANA     Number of Steps (Stairs)  1  -CHANDANA     Ascending Technique (Stairs)  step-to-step  -CHANDANA     Descending Technique (Stairs)  step-to-step  -CHANDANA     Comment (Gait/Stairs)  Pt ambulation progressed to step through pattern, however, decreased speed. Verbal cues for maintaining body within walker, increasing weight bearing through L LE in order to increase gait efficiency, and no twisting on L knee when performing turns. Pt also ascended/descended 1 ABEL using backwards technique and front-wheeled walker. Verbal cues for LE sequencing and walker management. Gait/stair training limited by pain and fatigue.   -     Row Name 10/18/19 0903          Mobility Assessment/Intervention    Extremity Weight-bearing Status  left lower extremity  -     Left Lower Extremity (Weight-bearing Status)  weight-bearing as tolerated (WBAT)  -       User Key  (r) = Recorded By, (t) = Taken By, (c) = Cosigned By    Initials Name Provider Type    Abelino Santos, PT Physical Therapist        Obj/Interventions     Row Name 10/18/19 0903          General ROM    GENERAL ROM COMMENTS  R LE WFL; L LE 2-85, lacking 2 degrees of extension and flexion measured in sitting  -     Row Name 10/18/19 0903          MMT (Manual Muscle Testing)    General MMT Comments  L LE functionally 4-/5; able to independently perform SLR  -     Row Name 10/18/19 0903          Therapeutic Exercise    Lower Extremity (Therapeutic Exercise)  gluteal sets;heel slides, left;LAQ (long arc quad), left;SAQ (short arc quad), left;quad sets, left;SLR (straight leg raise), left  -     Lower Extremity Range of Motion (Therapeutic Exercise)  ankle dorsiflexion/plantar flexion, bilateral  -     Exercise Type (Therapeutic Exercise)  AROM (active range of motion);isometric contraction, static  -     Position (Therapeutic Exercise)   seated  -CHANDANA     Sets/Reps (Therapeutic Exercise)  15x each  -CHANDANA     Comment (Therapeutic Exercise)  Cues for technique  -CHANDANA       User Key  (r) = Recorded By, (t) = Taken By, (c) = Cosigned By    Initials Name Provider Type    Abelino Santos, PT Physical Therapist        Goals/Plan     Row Name 10/18/19 0903          Bed Mobility Goal 1 (PT)    Activity/Assistive Device (Bed Mobility Goal 1, PT)  bed mobility activities, all  -CHANDANA     [place] Level/Cues Needed (Bed Mobility Goal 1, PT)  conditional independence  -CHANDANA     Time Frame (Bed Mobility Goal 1, PT)  long term goal (LTG);3 days  -CHANDANA     Progress/Outcomes (Bed Mobility Goal 1, PT)  goal not met;discharged from [facility] 10/18/19 0903          Transfer Goal 1 (PT)    Activity/Assistive Device (Transfer Goal 1, PT)  sit-to-stand/stand-to-sit;bed-to-chair/chair-to-bed  -CHANDANA     [place] Level/Cues Needed (Transfer Goal 1, PT)  conditional independence  -CHANDANA     Time Frame (Transfer Goal 1, PT)  long term goal (LTG);3 days  -CHANDANA     Progress/Outcome (Transfer Goal 1, PT)  goal not met;discharged from [facility] 10/18/19 0903          Gait Training Goal 1 (PT)    Activity/Assistive Device (Gait Training Goal 1, PT)  gait (walking locomotion);walker, rolling  -CHANDANA     [place] Level (Gait Training Goal 1, PT)  supervision required  -CHANDANA     Time Frame (Gait Training Goal 1, PT)  long term goal (LTG);3 days  -CHANDANA     Barriers (Gait Training Goal 1, PT)  150'  -CHANDANA     Progress/Outcome (Gait Training Goal 1, PT)  goal partially met;discharged from [facility] 10/18/19 0903          ROM Goal 1 (PT)    ROM Goal 1 (PT)  0-90 deg knee AAROM  -CHANDANA     Time Frame (ROM Goal 1, PT)  long term goal (LTG);3 days  -CHANDANA     Progress/Outcome (ROM Goal 1, PT)  goal not met;discharged from [facility] 10/18/19 0903          Stairs Goal 1 (PT)    Activity/Assistive Device (Stairs Goal 1, PT)  stairs, all skills;walker,  rolling  -CHANDANA     [place] Level/Cues Needed (Stairs Goal 1, PT)  standby assist  -CHANDANA     Number of Stairs (Stairs Goal 1, PT)  1  -CHANDANA     Time Frame (Stairs Goal 1, PT)  long term goal (LTG);3 days  -CHANDANA     Progress/Outcome (Stairs Goal 1, PT)  goal partially met;discharged from facility  -CHANDANA       User Key  (r) = Recorded By, (t) = Taken By, (c) = Cosigned By    Initials Name Provider Type    Abelino Santos, PT Physical Therapist        Clinical Impression     Row Name 10/18/19 0903          Pain Assessment    Additional Documentation  Pain Scale: Numbers Pre/Post-Treatment (Group)  -     Row Name 10/18/19 0903          Pain Scale: Numbers Pre/Post-Treatment    Pain Scale: Numbers, Pretreatment  7/10  -CHANDANA     Pain Scale: Numbers, Post-Treatment  8/10  -CHANDANA     Pain Location - Side  Left  -CHANDANA     Pain Location - Orientation  anterior  -CHANDANA     Pain Location  knee  -CHANDANA     Pain Intervention(s)  Cold applied;Repositioned;Ambulation/increased activity  -     Row Name 10/18/19 0903          Plan of Care Review    Plan of Care Reviewed With  patient  -     Row Name 10/18/19 0903          Physical Therapy Clinical Impression    Patient/Family Goals Statement (PT Clinical Impression)  To return home  -     Criteria for Skilled Interventions Met (PT Clinical Impression)  yes;treatment indicated  -CHANDANA     Rehab Potential (PT Clinical Summary)  good, to achieve stated therapy goals  -     Row Name 10/18/19 0903          Positioning and Restraints    Pre-Treatment Position  in bed  -CHANDANA     Post Treatment Position  chair  -CHANDANA     In Chair  notified nsg;reclined;sitting;call light within reach;encouraged to call for assist;exit alarm on;legs elevated  -       User Key  (r) = Recorded By, (t) = Taken By, (c) = Cosigned By    Initials Name Provider Type    Abelino Santos, PT Physical Therapist        Outcome Measures     Row Name 10/18/19 0903          How much help from another person do you currently need...     Turning from your back to your side while in flat bed without using bedrails?  3  -CHANDANA     Moving from lying on back to sitting on the side of a flat bed without bedrails?  3  -CHANDANA     Moving to and from a bed to a chair (including a wheelchair)?  3  -CHANDANA     Standing up from a chair using your arms (e.g., wheelchair, bedside chair)?  3  -CHANDANA     Climbing 3-5 steps with a railing?  3  -CHANDANA     To walk in hospital room?  3  -CHANDANA     AM-PAC 6 Clicks Score (PT)  18  -CHANDANA     Row Name 10/18/19 0903          Functional Assessment    Outcome Measure Options  AM-PAC 6 Clicks Basic Mobility (PT)  -       User Key  (r) = Recorded By, (t) = Taken By, (c) = Cosigned By    Initials Name Provider Type    Abelino Santos, PT Physical Therapist        Physical Therapy Education     Title: PT OT SLP Therapies (Done)     Topic: Physical Therapy (Done)     Point: Mobility training (Done)     Learning Progress Summary           Patient Acceptance, E,D, VU by CHANDANA at 10/18/2019  9:03 AM    Comment:  Reviewed HEP, educated pt on knee precautions, gait mechanics, sequencing with bed mobility, and reviewed POC    Acceptance, E,D, VU,NR by CS at 10/17/2019  4:18 PM    Comment:  Educated patient on precautions with knee replacement, hand placement with walker sit to stand, gait mechanics with ambulation with walker, to feel chair behind her when sitting.   Significant Other Acceptance, E,D, VU,NR by CS at 10/17/2019  4:18 PM    Acceptance, E,D, VU,NR by CS at 10/17/2019  4:18 PM    Comment:  Educated patient on precautions with knee replacement, hand placement with walker sit to stand, gait mechanics with ambulation with walker, to feel chair behind her when sitting.                   Point: Home exercise program (Done)     Learning Progress Summary           Patient Acceptance, E,D, VU by CHANDANA at 10/18/2019  9:03 AM    Comment:  Reviewed HEP, educated pt on knee precautions, gait mechanics, sequencing with bed mobility, and reviewed POC     Acceptance, E,D, VU,NR by CS at 10/17/2019  4:18 PM    Acceptance, E,D, VU,NR by CS at 10/17/2019  4:18 PM    Comment:  Educated patient on precautions with knee replacement, hand placement with walker sit to stand, gait mechanics with ambulation with walker, to feel chair behind her when sitting.   Significant Other Acceptance, E,D, VU,NR by CS at 10/17/2019  4:18 PM    Acceptance, E,D, VU,NR by CS at 10/17/2019  4:18 PM    Comment:  Educated patient on precautions with knee replacement, hand placement with walker sit to stand, gait mechanics with ambulation with walker, to feel chair behind her when sitting.                   Point: Body mechanics (Done)     Learning Progress Summary           Patient Acceptance, E,D, VU by CHANDANA at 10/18/2019  9:03 AM    Comment:  Reviewed HEP, educated pt on knee precautions, gait mechanics, sequencing with bed mobility, and reviewed POC    Acceptance, E,D, VU,NR by CS at 10/17/2019  4:18 PM    Acceptance, E,D, VU,NR by CS at 10/17/2019  4:18 PM    Comment:  Educated patient on precautions with knee replacement, hand placement with walker sit to stand, gait mechanics with ambulation with walker, to feel chair behind her when sitting.   Significant Other Acceptance, E,D, VU,NR by CS at 10/17/2019  4:18 PM    Acceptance, E,D, VU,NR by CS at 10/17/2019  4:18 PM    Comment:  Educated patient on precautions with knee replacement, hand placement with walker sit to stand, gait mechanics with ambulation with walker, to feel chair behind her when sitting.                   Point: Precautions (Done)     Learning Progress Summary           Patient Acceptance, E,D, VU by CHANDANA at 10/18/2019  9:03 AM    Comment:  Reviewed HEP, educated pt on knee precautions, gait mechanics, sequencing with bed mobility, and reviewed POC    Acceptance, E,D, VU,NR by CS at 10/17/2019  4:18 PM    Acceptance, E,D, VU,NR by CS at 10/17/2019  4:18 PM    Comment:  Educated patient on precautions with knee replacement,  hand placement with walker sit to stand, gait mechanics with ambulation with walker, to feel chair behind her when sitting.   Significant Other Acceptance, E,D, VU,NR by  at 10/17/2019  4:18 PM    Acceptance, E,D, VU,NR by  at 10/17/2019  4:18 PM    Comment:  Educated patient on precautions with knee replacement, hand placement with walker sit to stand, gait mechanics with ambulation with walker, to feel chair behind her when sitting.                               User Key     Initials Effective Dates Name Provider Type Discipline     03/26/19 -  Shagufta Velasco, PT Physical Therapist PT     09/10/19 -  Abelino Chicas, PT Physical Therapist PT              PT Recommendation and Plan  Planned Therapy Interventions (PT Eval): balance training, bed mobility training, gait training, home exercise program, patient/family education, ROM (range of motion), stair training, strengthening, transfer training  Outcome Summary/Treatment Plan (PT)  Anticipated Equipment Needs at Discharge (PT): other (see comments)(none)  Anticipated Discharge Disposition (PT): home with assist, home with home health  Plan of Care Reviewed With: patient  Progress: improving  Outcome Summary: Pt increased ambulation distance to 160 feet with CGA x1 and front-wheeled walker. Pt also ascended/descended 1 step using backwards technique with CGA x1 and front-wheeled walker. Gait/stair training limited by pain and fatigue. Pt L knee ROM measured at 2-85 degrees. Anticipate pt d/c home with assist and HHPT.      Time Calculation:   PT Charges     Row Name 10/18/19 0903             Time Calculation    Start Time  0903  -CHANDANA      PT Received On  10/18/19  -      PT Goal Re-Cert Due Date  10/27/19  -         Time Calculation- PT    Total Timed Code Minutes- PT  27 minute(s)  -CHANDANA         Timed Charges    59202 - PT Therapeutic Exercise Minutes  10  -CHANDANA      49394 - Gait Training Minutes   17  -CHANDANA        User Key  (r) = Recorded By, (t) = Taken  By, (c) = Cosigned By    Initials Name Provider Type    Abelino Santos, PT Physical Therapist        Therapy Charges for Today     Code Description Service Date Service Provider Modifiers Qty    92364932069 HC PT THER PROC EA 15 MIN 10/18/2019 Abelino Chicas, PT GP 1    31983784398 HC GAIT TRAINING EA 15 MIN 10/18/2019 Abelino Chicas, PT GP 1          PT G-Codes  Outcome Measure Options: AM-PAC 6 Clicks Basic Mobility (PT)  AM-PAC 6 Clicks Score (PT): 18    PT Discharge Summary  Anticipated Discharge Disposition (PT): home with assist, home with home health    Abelino Chicas, VENITA  10/18/2019

## 2019-10-18 NOTE — DISCHARGE SUMMARY
Patient Name: Ирина Gutierrez  MRN: [id number]  : 1955  DOS: 10/18/2019    Attending: Grant Diez,*    Primary Care Provider: Preston Grant MD    Date of Admission:.10/17/2019  7:41 AM    Date of Discharge:  10/18/2019    Discharge Diagnosis:   Status post total left knee replacement    Primary osteoarthritis of left knee    Hyperlipidemia    HTN (hypertension)    ABLA, mild, asymptomatic    Acute postop pain    Hospital Course  Patient is a 64 y.o. female presented for left total knee arthroplasty by Dr. Diez.     She underwent surgery under spinal anesthesia. She tolerated surgery well and was admitted for further medical management.     She is known to us from previous admission for ACDF in 2017; which she recovered well.     She has hx of cdiff last , HTN, HLD      Patient was provided pain medications as needed for pain control, along with adductor canal nerve block infusion of Ropivacaine.    Adjustments were made to pain medications to optimize postop pain management. Risks and benefits of opiate medications discussed with patient.    She was seen by PT and OT and has progressed well over her stay.  She used an IS for atelectasis prophylaxis and aspirin along with mechanicals for DVT prophylaxis.  Home medications were resumed as appropriate, and labs were monitored and remained fairly stable.     With the progress she has made, she is ready for DC home today.    She will have an Arrow pump ( instructed on it during this admit).  Discussed with patient regarding plan and she shows understanding and agreement.    Patient will have HHPT following discharge.      Procedures Performed  Preoperative diagnosis:Left knee end stage osteoarthritis     Postoperative diagnosis: Left knee end stage osteoarthritis     Operative procedure: Left total knee arthroplasty     Surgeon: Dr. Sekou Diez    Pertinent Test Results:    I reviewed the patient's new clinical results.   Results from  "last 7 days   Lab Units 10/18/19  0840   WBC 10*3/mm3 7.73   HEMOGLOBIN g/dL 10.5*   HEMATOCRIT % 33.6*   PLATELETS 10*3/mm3 213     Results for KAROL BURGESS (MRN [id number]) as of 10/19/2019 15:37   Ref. Range 10/8/2019 12:30   Hemoglobin Latest Ref Range: 12.0 - 15.9 g/dL 12.6   Hematocrit Latest Ref Range: 34.0 - 46.6 % 39.6     Results from last 7 days   Lab Units 10/18/19  0840   SODIUM mmol/L 140   POTASSIUM mmol/L 4.0   CHLORIDE mmol/L 108*   CO2 mmol/L 25.0   BUN mg/dL 15   CREATININE mg/dL 0.69   CALCIUM mg/dL 8.5*   GLUCOSE mg/dL 146*     I reviewed the patient's new imaging including images and reports.      Physical therapy: Pt increased ambulation distance to 160 feet with CGA x1 and front-wheeled walker. Pt also ascended/descended 1 step using backwards technique with CGA x1 and front-wheeled walker. Gait/stair training limited by pain and fatigue. Pt L knee ROM measured at 2-85 degrees. Anticipate pt d/c home with assist and HHPT.     Discharge Assessment:    Vital Signs  Visit Vitals  /86 (BP Location: Left arm, Patient Position: Lying)   Pulse 77   Temp 98.6 °F (37 °C) (Oral)   Resp 16   Ht 162.6 cm (64\")   Wt 93 kg (205 lb)   SpO2 94%   BMI 35.19 kg/m²     Temp (24hrs), Av °F (36.7 °C), Min:97.5 °F (36.4 °C), Max:98.6 °F (37 °C)      General Appearance:    Alert, cooperative, in no acute distress   Lungs:     Clear to auscultation,respirations regular, even and                   unlabored    Heart:    Regular rhythm and normal rate, normal S1 and S2   Abdomen:     Normal bowel sounds, no masses, no organomegaly, soft        non-tender, non-distended, no guarding, no rebound                 tenderness   Extremities:   Moves all extremities well, no edema, no cyanosis, no              Redness. Left knee Prineo CDI. Nerve block present   Pulses:   Pulses palpable and equal bilaterally   Skin:   No bleeding, bruising or rash   Neurologic:   Cranial nerves 2 - 12 grossly intact, " sensation intact. Flexion and dorsiflexion intact bilateral feet.       Discharge Disposition: Home    Discharge Medications     Discharge Medications      New Medications      Instructions Start Date   docusate sodium 100 MG capsule  Commonly known as:  COLACE   100 mg, Oral, 2 Times Daily PRN      lactobacillus acidophilus capsule capsule   1 capsule, Oral, Daily   Start Date:  10/19/2019        Changes to Medications      Instructions Start Date   aspirin 81 MG EC tablet  What changed:  additional instructions   81 mg, Oral, Daily, Resume in 1 month      aspirin 325 MG EC tablet  What changed:  You were already taking a medication with the same name, and this prescription was added. Make sure you understand how and when to take each.   325 mg, Oral, Daily, For 1 month   Start Date:  10/19/2019     HYDROcodone-acetaminophen 7.5-325 MG per tablet  Commonly known as:  NORCO  What changed:  when to take this   1 tablet, Oral, Every 4 Hours PRN         Continue These Medications      Instructions Start Date   DEXILANT 60 MG capsule  Generic drug:  dexlansoprazole   60 mg, Oral, Daily      fesoterodine fumarate 8 MG tablet sustained-release 24 hour tablet  Commonly known as:  TOVIAZ ER   8 mg, Oral, Every 24 Hours Scheduled      FLUoxetine 20 MG capsule  Commonly known as:  PROzac   20 mg, Oral, Daily      levocetirizine 5 MG tablet  Commonly known as:  XYZAL   5 mg, Oral, Every Evening      metoprolol tartrate 25 MG tablet  Commonly known as:  LOPRESSOR   25 mg, Oral, 2 Times Daily      MIRALAX PO   1 dose, Oral, Daily      MYRBETRIQ 25 MG tablet sustained-release 24 hour 24 hr tablet  Generic drug:  Mirabegron ER   25 mg, Oral, Daily      NON FORMULARY   1 each, Daily, CBD gel cap, 25mg daily       pentosan polysulfate 100 MG capsule  Commonly known as:  ELMIRON   100 mg, Oral, 2 Times Daily      pravastatin 20 MG tablet  Commonly known as:  PRAVACHOL   20 mg, Oral, Daily      temazepam 30 MG capsule  Commonly known  as:  RESTORIL   30 mg, Oral, Nightly PRN             Discharge Diet: Regular diet    Activity at Discharge: WBAT LLE    Follow-up Appointments  Dr. Diez per his orders      RAJ Ramos  10/18/19  10:54 AM

## 2019-10-18 NOTE — PLAN OF CARE
Problem: Patient Care Overview  Goal: Plan of Care Review  Outcome: Ongoing (interventions implemented as appropriate)   10/18/19 0526   Coping/Psychosocial   Plan of Care Reviewed With patient   OTHER   Outcome Summary uneventful shift, vss, slept well, pain controlled po and arrow pump, adequate urine per bsc/incont a couple times. on 1Lnc and attempting to wean off. possible dc today.       Problem: Pain, Chronic (Adult)  Goal: Identify Related Risk Factors and Signs and Symptoms  Outcome: Ongoing (interventions implemented as appropriate)   10/18/19 0526   Pain, Chronic (Adult)   Related Risk Factors (Chronic Pain) disease process   Signs and Symptoms (Chronic Pain) verbalization of pain descriptors     Goal: Acceptable Pain/Comfort Level and Functional Ability  Outcome: Ongoing (interventions implemented as appropriate)   10/18/19 0526   Pain, Chronic (Adult)   Acceptable Pain/Comfort Level and Functional Ability making progress toward outcome

## 2019-10-20 NOTE — PROGRESS NOTES
[facility]    Nerve Cath Post Op Call    Patient Name: Ирина Gutierrez  :  1955  MRN:  [id number]  Date of Discharge: 10/18/2019    Nerve Cath Post Op Call:    Catheter Plan:Patient/Family member report nerve catheter previously discontinued, tip intact

## 2020-09-10 ENCOUNTER — APPOINTMENT (OUTPATIENT)
Dept: PREADMISSION TESTING | Facility: HOSPITAL | Age: 65
End: 2020-09-10

## 2020-09-10 ENCOUNTER — HOSPITAL ENCOUNTER (OUTPATIENT)
Dept: GENERAL RADIOLOGY | Facility: HOSPITAL | Age: 65
Discharge: HOME | End: 2020-09-10

## 2020-09-10 VITALS — BODY MASS INDEX: 30.9 KG/M2 | WEIGHT: 181 LBS | HEIGHT: 64 IN

## 2020-09-10 LAB
ANION GAP SERPL CALCULATED.3IONS-SCNC: 10 MMOL/L (ref 5–15)
BUN SERPL-MCNC: 15 MG/DL (ref 8–23)
BUN/CREAT SERPL: 18.8 (ref 7–25)
CALCIUM SPEC-SCNC: 9.4 MG/DL (ref 8.6–10.5)
CHLORIDE SERPL-SCNC: 102 MMOL/L (ref 98–107)
CO2 SERPL-SCNC: 24 MMOL/L (ref 22–29)
CREAT SERPL-MCNC: 0.8 MG/DL (ref 0.57–1)
DEPRECATED RDW RBC AUTO: 45.5 FL (ref 37–54)
ERYTHROCYTE [DISTWIDTH] IN BLOOD BY AUTOMATED COUNT: 13.1 % (ref 12.3–15.4)
GFR SERPL CREATININE-BSD FRML MDRD: 72 ML/MIN/1.73
GLUCOSE SERPL-MCNC: 97 MG/DL (ref 65–99)
HBA1C MFR BLD: 5.5 % (ref 4.8–5.6)
HCT VFR BLD AUTO: 39.1 % (ref 34–46.6)
HGB BLD-MCNC: 12.6 G/DL (ref 12–15.9)
MCH RBC QN AUTO: 30.7 PG (ref 26.6–33)
MCHC RBC AUTO-ENTMCNC: 32.2 G/DL (ref 31.5–35.7)
MCV RBC AUTO: 95.1 FL (ref 79–97)
PLATELET # BLD AUTO: 234 10*3/MM3 (ref 140–450)
PMV BLD AUTO: 9.9 FL (ref 6–12)
POTASSIUM SERPL-SCNC: 4.6 MMOL/L (ref 3.5–5.2)
RBC # BLD AUTO: 4.11 10*6/MM3 (ref 3.77–5.28)
SODIUM SERPL-SCNC: 136 MMOL/L (ref 136–145)
WBC # BLD AUTO: 5.83 10*3/MM3 (ref 3.4–10.8)

## 2020-09-10 PROCEDURE — 71046 X-RAY EXAM CHEST 2 VIEWS: CPT

## 2020-09-10 PROCEDURE — 93005 ELECTROCARDIOGRAM TRACING: CPT

## 2020-09-10 PROCEDURE — 85027 COMPLETE CBC AUTOMATED: CPT | Performed by: ORTHOPAEDIC SURGERY

## 2020-09-10 PROCEDURE — 93010 ELECTROCARDIOGRAM REPORT: CPT | Performed by: INTERNAL MEDICINE

## 2020-09-10 PROCEDURE — 83036 HEMOGLOBIN GLYCOSYLATED A1C: CPT | Performed by: ORTHOPAEDIC SURGERY

## 2020-09-10 PROCEDURE — 80048 BASIC METABOLIC PNL TOTAL CA: CPT | Performed by: ORTHOPAEDIC SURGERY

## 2020-09-10 PROCEDURE — 36415 COLL VENOUS BLD VENIPUNCTURE: CPT

## 2020-09-10 ASSESSMENT — KOOS JR
KOOS JR SCORE: 42.281
KOOS JR SCORE: 18

## 2020-09-22 ENCOUNTER — APPOINTMENT (OUTPATIENT)
Dept: PREADMISSION TESTING | Facility: HOSPITAL | Age: 65
End: 2020-09-22

## 2020-09-22 PROCEDURE — U0004 COV-19 TEST NON-CDC HGH THRU: HCPCS

## 2020-09-22 PROCEDURE — C9803 HOPD COVID-19 SPEC COLLECT: HCPCS

## 2020-09-23 ENCOUNTER — ANESTHESIA EVENT (OUTPATIENT)
Dept: PERIOP | Facility: HOSPITAL | Age: 65
End: 2020-09-23

## 2020-09-23 LAB — SARS-COV-2 RNA NOSE QL NAA+PROBE: NOT DETECTED

## 2020-09-23 RX ORDER — SODIUM CHLORIDE 0.9 % (FLUSH) 0.9 %
10 SYRINGE (ML) INJECTION EVERY 12 HOURS SCHEDULED
Status: CANCELLED | OUTPATIENT
Start: 2020-09-23

## 2020-09-23 RX ORDER — SODIUM CHLORIDE 0.9 % (FLUSH) 0.9 %
10 SYRINGE (ML) INJECTION AS NEEDED
Status: CANCELLED | OUTPATIENT
Start: 2020-09-23

## 2020-09-23 RX ORDER — FAMOTIDINE 10 MG/ML
20 INJECTION, SOLUTION INTRAVENOUS ONCE
Status: CANCELLED | OUTPATIENT
Start: 2020-09-23 | End: 2020-09-23

## 2020-09-24 ENCOUNTER — ANESTHESIA (OUTPATIENT)
Dept: PERIOP | Facility: HOSPITAL | Age: 65
End: 2020-09-24

## 2020-09-24 ENCOUNTER — HOSPITAL ENCOUNTER (OUTPATIENT)
Facility: HOSPITAL | Age: 65
Discharge: HOME | End: 2020-09-25
Attending: ORTHOPAEDIC SURGERY | Admitting: ORTHOPAEDIC SURGERY

## 2020-09-24 ENCOUNTER — APPOINTMENT (OUTPATIENT)
Dept: GENERAL RADIOLOGY | Facility: HOSPITAL | Age: 65
End: 2020-09-24

## 2020-09-24 DIAGNOSIS — Z96.651 STATUS POST TOTAL RIGHT KNEE REPLACEMENT: Primary | ICD-10-CM

## 2020-09-24 PROBLEM — M17.11 PRIMARY LOCALIZED OSTEOARTHRITIS OF RIGHT KNEE: Status: ACTIVE | Noted: 2020-09-24

## 2020-09-24 PROCEDURE — 25010000003 MORPHINE PER 10 MG: Performed by: ORTHOPAEDIC SURGERY

## 2020-09-24 PROCEDURE — 25010000003 MEPIVACAINE PER 10 ML: Performed by: ANESTHESIOLOGY

## 2020-09-24 PROCEDURE — 25010000002 PROPOFOL 10 MG/ML EMULSION: Performed by: NURSE ANESTHETIST, CERTIFIED REGISTERED

## 2020-09-24 PROCEDURE — 25010000002 FENTANYL CITRATE (PF) 100 MCG/2ML SOLUTION: Performed by: NURSE ANESTHETIST, CERTIFIED REGISTERED

## 2020-09-24 PROCEDURE — 97161 PT EVAL LOW COMPLEX 20 MIN: CPT

## 2020-09-24 PROCEDURE — 25010000002 ROPIVACAINE PER 1 MG: Performed by: ORTHOPAEDIC SURGERY

## 2020-09-24 PROCEDURE — C1713 ANCHOR/SCREW BN/BN,TIS/BN: HCPCS | Performed by: ORTHOPAEDIC SURGERY

## 2020-09-24 PROCEDURE — 25010000003 CEFAZOLIN IN DEXTROSE 2-4 GM/100ML-% SOLUTION: Performed by: ORTHOPAEDIC SURGERY

## 2020-09-24 PROCEDURE — 73560 X-RAY EXAM OF KNEE 1 OR 2: CPT

## 2020-09-24 PROCEDURE — 25010000002 DEXAMETHASONE PER 1 MG: Performed by: NURSE ANESTHETIST, CERTIFIED REGISTERED

## 2020-09-24 PROCEDURE — 97116 GAIT TRAINING THERAPY: CPT

## 2020-09-24 PROCEDURE — 25010000002 ONDANSETRON PER 1 MG: Performed by: NURSE ANESTHETIST, CERTIFIED REGISTERED

## 2020-09-24 PROCEDURE — C1776 JOINT DEVICE (IMPLANTABLE): HCPCS | Performed by: ORTHOPAEDIC SURGERY

## 2020-09-24 DEVICE — IMPLANTABLE DEVICE: Type: IMPLANTABLE DEVICE | Site: KNEE | Status: FUNCTIONAL

## 2020-09-24 DEVICE — GENESIS II NON-POROUS TIBIAL                                    BASEPLATE SIZE 2 RIGHT
Type: IMPLANTABLE DEVICE | Site: KNEE | Status: FUNCTIONAL
Brand: GENESIS II

## 2020-09-24 DEVICE — CMT BONE PALACOS R HI/VISC 1X40: Type: IMPLANTABLE DEVICE | Site: KNEE | Status: FUNCTIONAL

## 2020-09-24 DEVICE — GEN II 7.5MM RESUR PAT 29MM
Type: IMPLANTABLE DEVICE | Site: KNEE | Status: FUNCTIONAL
Brand: GENESIS II

## 2020-09-24 DEVICE — DEV CONTRL TISS STRATAFIX SPIRAL PGPCL 2/0FS 30X30CM: Type: IMPLANTABLE DEVICE | Site: KNEE | Status: FUNCTIONAL

## 2020-09-24 DEVICE — LEGION PS HIGH FLEX XLPE SZ 1-2 9MM
Type: IMPLANTABLE DEVICE | Site: KNEE | Status: FUNCTIONAL
Brand: LEGION

## 2020-09-24 DEVICE — LEGION NARROW POSTERIOR STABILIZED                                    OXINIUM SIZE 4N RIGHT
Type: IMPLANTABLE DEVICE | Site: KNEE | Status: FUNCTIONAL
Brand: LEGION

## 2020-09-24 DEVICE — DEV CONTRL TISS STRATAFIX SYMM PDS PLUS VIL CT-1 45CM: Type: IMPLANTABLE DEVICE | Site: KNEE | Status: FUNCTIONAL

## 2020-09-24 RX ORDER — DOCUSATE SODIUM 100 MG/1
100 CAPSULE, LIQUID FILLED ORAL 2 TIMES DAILY PRN
Status: DISCONTINUED | OUTPATIENT
Start: 2020-09-24 | End: 2020-09-25 | Stop reason: HOSPADM

## 2020-09-24 RX ORDER — MAGNESIUM HYDROXIDE 1200 MG/15ML
LIQUID ORAL AS NEEDED
Status: DISCONTINUED | OUTPATIENT
Start: 2020-09-24 | End: 2020-09-24 | Stop reason: HOSPADM

## 2020-09-24 RX ORDER — ONDANSETRON 2 MG/ML
INJECTION INTRAMUSCULAR; INTRAVENOUS AS NEEDED
Status: DISCONTINUED | OUTPATIENT
Start: 2020-09-24 | End: 2020-09-24 | Stop reason: SURG

## 2020-09-24 RX ORDER — OXYCODONE HYDROCHLORIDE 5 MG/1
10 TABLET ORAL EVERY 4 HOURS PRN
Status: DISCONTINUED | OUTPATIENT
Start: 2020-09-24 | End: 2020-09-25 | Stop reason: HOSPADM

## 2020-09-24 RX ORDER — POLYETHYLENE GLYCOL 3350 17 G/17G
17 POWDER, FOR SOLUTION ORAL DAILY
Status: DISCONTINUED | OUTPATIENT
Start: 2020-09-24 | End: 2020-09-25 | Stop reason: HOSPADM

## 2020-09-24 RX ORDER — SODIUM CHLORIDE 0.9 % (FLUSH) 0.9 %
3 SYRINGE (ML) INJECTION EVERY 12 HOURS SCHEDULED
Status: DISCONTINUED | OUTPATIENT
Start: 2020-09-24 | End: 2020-09-24

## 2020-09-24 RX ORDER — PANTOPRAZOLE SODIUM 40 MG/1
40 TABLET, DELAYED RELEASE ORAL
Status: DISCONTINUED | OUTPATIENT
Start: 2020-09-24 | End: 2020-09-25 | Stop reason: HOSPADM

## 2020-09-24 RX ORDER — DEXAMETHASONE SODIUM PHOSPHATE 4 MG/ML
INJECTION, SOLUTION INTRA-ARTICULAR; INTRALESIONAL; INTRAMUSCULAR; INTRAVENOUS; SOFT TISSUE AS NEEDED
Status: DISCONTINUED | OUTPATIENT
Start: 2020-09-24 | End: 2020-09-24 | Stop reason: SURG

## 2020-09-24 RX ORDER — TEMAZEPAM 15 MG/1
30 CAPSULE ORAL NIGHTLY PRN
Status: DISCONTINUED | OUTPATIENT
Start: 2020-09-24 | End: 2020-09-25 | Stop reason: HOSPADM

## 2020-09-24 RX ORDER — HYDROCODONE BITARTRATE AND ACETAMINOPHEN 5; 325 MG/1; MG/1
1 TABLET ORAL ONCE AS NEEDED
Status: DISCONTINUED | OUTPATIENT
Start: 2020-09-24 | End: 2020-09-24

## 2020-09-24 RX ORDER — FLUOXETINE HYDROCHLORIDE 20 MG/1
20 CAPSULE ORAL DAILY
Status: DISCONTINUED | OUTPATIENT
Start: 2020-09-25 | End: 2020-09-25 | Stop reason: HOSPADM

## 2020-09-24 RX ORDER — BISACODYL 10 MG
10 SUPPOSITORY, RECTAL RECTAL DAILY PRN
Status: DISCONTINUED | OUTPATIENT
Start: 2020-09-24 | End: 2020-09-25 | Stop reason: HOSPADM

## 2020-09-24 RX ORDER — FENTANYL CITRATE 50 UG/ML
50 INJECTION, SOLUTION INTRAMUSCULAR; INTRAVENOUS
Status: DISCONTINUED | OUTPATIENT
Start: 2020-09-24 | End: 2020-09-24

## 2020-09-24 RX ORDER — ONDANSETRON 4 MG/1
4 TABLET, FILM COATED ORAL EVERY 6 HOURS PRN
Status: DISCONTINUED | OUTPATIENT
Start: 2020-09-24 | End: 2020-09-25 | Stop reason: HOSPADM

## 2020-09-24 RX ORDER — KETOROLAC TROMETHAMINE 15 MG/ML
15 INJECTION, SOLUTION INTRAMUSCULAR; INTRAVENOUS EVERY 6 HOURS PRN
Status: DISCONTINUED | OUTPATIENT
Start: 2020-09-24 | End: 2020-09-25 | Stop reason: HOSPADM

## 2020-09-24 RX ORDER — ACETAMINOPHEN 500 MG
1000 TABLET ORAL ONCE
Status: COMPLETED | OUTPATIENT
Start: 2020-09-24 | End: 2020-09-24

## 2020-09-24 RX ORDER — FAMOTIDINE 20 MG/1
20 TABLET, FILM COATED ORAL ONCE
Status: COMPLETED | OUTPATIENT
Start: 2020-09-24 | End: 2020-09-24

## 2020-09-24 RX ORDER — LIDOCAINE HYDROCHLORIDE 10 MG/ML
0.5 INJECTION, SOLUTION EPIDURAL; INFILTRATION; INTRACAUDAL; PERINEURAL ONCE AS NEEDED
Status: COMPLETED | OUTPATIENT
Start: 2020-09-24 | End: 2020-09-24

## 2020-09-24 RX ORDER — BISACODYL 5 MG/1
10 TABLET, DELAYED RELEASE ORAL DAILY PRN
Status: DISCONTINUED | OUTPATIENT
Start: 2020-09-24 | End: 2020-09-25 | Stop reason: HOSPADM

## 2020-09-24 RX ORDER — MELOXICAM 7.5 MG/1
15 TABLET ORAL DAILY
Status: DISCONTINUED | OUTPATIENT
Start: 2020-09-24 | End: 2020-09-25 | Stop reason: HOSPADM

## 2020-09-24 RX ORDER — ONDANSETRON 2 MG/ML
4 INJECTION INTRAMUSCULAR; INTRAVENOUS EVERY 6 HOURS PRN
Status: DISCONTINUED | OUTPATIENT
Start: 2020-09-24 | End: 2020-09-24 | Stop reason: SDUPTHER

## 2020-09-24 RX ORDER — SODIUM CHLORIDE 0.9 % (FLUSH) 0.9 %
3-10 SYRINGE (ML) INJECTION AS NEEDED
Status: DISCONTINUED | OUTPATIENT
Start: 2020-09-24 | End: 2020-09-24

## 2020-09-24 RX ORDER — BUPIVACAINE HYDROCHLORIDE 2.5 MG/ML
INJECTION, SOLUTION EPIDURAL; INFILTRATION; INTRACAUDAL
Status: COMPLETED | OUTPATIENT
Start: 2020-09-24 | End: 2020-09-24

## 2020-09-24 RX ORDER — OXYCODONE HYDROCHLORIDE 5 MG/1
5 TABLET ORAL EVERY 4 HOURS PRN
Status: DISCONTINUED | OUTPATIENT
Start: 2020-09-24 | End: 2020-09-25 | Stop reason: HOSPADM

## 2020-09-24 RX ORDER — IPRATROPIUM BROMIDE AND ALBUTEROL SULFATE 2.5; .5 MG/3ML; MG/3ML
3 SOLUTION RESPIRATORY (INHALATION) ONCE AS NEEDED
Status: DISCONTINUED | OUTPATIENT
Start: 2020-09-24 | End: 2020-09-24

## 2020-09-24 RX ORDER — AMOXICILLIN 250 MG
2 CAPSULE ORAL 2 TIMES DAILY PRN
Status: DISCONTINUED | OUTPATIENT
Start: 2020-09-24 | End: 2020-09-25 | Stop reason: HOSPADM

## 2020-09-24 RX ORDER — HYDROMORPHONE HYDROCHLORIDE 1 MG/ML
0.5 INJECTION, SOLUTION INTRAMUSCULAR; INTRAVENOUS; SUBCUTANEOUS
Status: DISCONTINUED | OUTPATIENT
Start: 2020-09-24 | End: 2020-09-24

## 2020-09-24 RX ORDER — MEPERIDINE HYDROCHLORIDE 25 MG/ML
12.5 INJECTION INTRAMUSCULAR; INTRAVENOUS; SUBCUTANEOUS
Status: DISCONTINUED | OUTPATIENT
Start: 2020-09-24 | End: 2020-09-24

## 2020-09-24 RX ORDER — ASPIRIN 325 MG
325 TABLET, DELAYED RELEASE (ENTERIC COATED) ORAL DAILY
Status: DISCONTINUED | OUTPATIENT
Start: 2020-09-25 | End: 2020-09-25 | Stop reason: HOSPADM

## 2020-09-24 RX ORDER — ACETAMINOPHEN 500 MG
1000 TABLET ORAL 3 TIMES DAILY
Status: DISCONTINUED | OUTPATIENT
Start: 2020-09-24 | End: 2020-09-25 | Stop reason: HOSPADM

## 2020-09-24 RX ORDER — PREGABALIN 75 MG/1
75 CAPSULE ORAL ONCE
Status: COMPLETED | OUTPATIENT
Start: 2020-09-24 | End: 2020-09-24

## 2020-09-24 RX ORDER — HYDROMORPHONE HYDROCHLORIDE 1 MG/ML
0.5 INJECTION, SOLUTION INTRAMUSCULAR; INTRAVENOUS; SUBCUTANEOUS
Status: DISCONTINUED | OUTPATIENT
Start: 2020-09-24 | End: 2020-09-25 | Stop reason: HOSPADM

## 2020-09-24 RX ORDER — CETIRIZINE HYDROCHLORIDE 10 MG/1
10 TABLET ORAL NIGHTLY
Status: DISCONTINUED | OUTPATIENT
Start: 2020-09-24 | End: 2020-09-25 | Stop reason: HOSPADM

## 2020-09-24 RX ORDER — NALOXONE HCL 0.4 MG/ML
0.4 VIAL (ML) INJECTION AS NEEDED
Status: DISCONTINUED | OUTPATIENT
Start: 2020-09-24 | End: 2020-09-24

## 2020-09-24 RX ORDER — ONDANSETRON 2 MG/ML
4 INJECTION INTRAMUSCULAR; INTRAVENOUS ONCE AS NEEDED
Status: DISCONTINUED | OUTPATIENT
Start: 2020-09-24 | End: 2020-09-24

## 2020-09-24 RX ORDER — DIPHENHYDRAMINE HYDROCHLORIDE 50 MG/ML
25 INJECTION INTRAMUSCULAR; INTRAVENOUS EVERY 6 HOURS PRN
Status: DISCONTINUED | OUTPATIENT
Start: 2020-09-24 | End: 2020-09-25 | Stop reason: HOSPADM

## 2020-09-24 RX ORDER — SODIUM CHLORIDE, SODIUM LACTATE, POTASSIUM CHLORIDE, CALCIUM CHLORIDE 600; 310; 30; 20 MG/100ML; MG/100ML; MG/100ML; MG/100ML
9 INJECTION, SOLUTION INTRAVENOUS CONTINUOUS
Status: DISCONTINUED | OUTPATIENT
Start: 2020-09-24 | End: 2020-09-25 | Stop reason: HOSPADM

## 2020-09-24 RX ORDER — ONDANSETRON 2 MG/ML
4 INJECTION INTRAMUSCULAR; INTRAVENOUS EVERY 6 HOURS PRN
Status: DISCONTINUED | OUTPATIENT
Start: 2020-09-24 | End: 2020-09-25 | Stop reason: HOSPADM

## 2020-09-24 RX ORDER — LABETALOL HYDROCHLORIDE 5 MG/ML
10 INJECTION, SOLUTION INTRAVENOUS EVERY 4 HOURS PRN
Status: DISCONTINUED | OUTPATIENT
Start: 2020-09-24 | End: 2020-09-25 | Stop reason: HOSPADM

## 2020-09-24 RX ORDER — LABETALOL HYDROCHLORIDE 5 MG/ML
5 INJECTION, SOLUTION INTRAVENOUS
Status: DISCONTINUED | OUTPATIENT
Start: 2020-09-24 | End: 2020-09-24

## 2020-09-24 RX ORDER — DIPHENHYDRAMINE HCL 25 MG
25 CAPSULE ORAL EVERY 6 HOURS PRN
Status: DISCONTINUED | OUTPATIENT
Start: 2020-09-24 | End: 2020-09-25 | Stop reason: HOSPADM

## 2020-09-24 RX ORDER — PRAVASTATIN SODIUM 20 MG
20 TABLET ORAL NIGHTLY
Status: DISCONTINUED | OUTPATIENT
Start: 2020-09-24 | End: 2020-09-25 | Stop reason: HOSPADM

## 2020-09-24 RX ORDER — CEFAZOLIN SODIUM 2 G/100ML
2 INJECTION, SOLUTION INTRAVENOUS ONCE
Status: COMPLETED | OUTPATIENT
Start: 2020-09-24 | End: 2020-09-24

## 2020-09-24 RX ORDER — SODIUM CHLORIDE 9 MG/ML
100 INJECTION, SOLUTION INTRAVENOUS CONTINUOUS
Status: DISCONTINUED | OUTPATIENT
Start: 2020-09-24 | End: 2020-09-25 | Stop reason: HOSPADM

## 2020-09-24 RX ORDER — CEFAZOLIN SODIUM 2 G/100ML
2 INJECTION, SOLUTION INTRAVENOUS EVERY 8 HOURS
Status: COMPLETED | OUTPATIENT
Start: 2020-09-24 | End: 2020-09-25

## 2020-09-24 RX ORDER — NALOXONE HCL 0.4 MG/ML
0.1 VIAL (ML) INJECTION
Status: DISCONTINUED | OUTPATIENT
Start: 2020-09-24 | End: 2020-09-25 | Stop reason: HOSPADM

## 2020-09-24 RX ORDER — HYDRALAZINE HYDROCHLORIDE 20 MG/ML
5 INJECTION INTRAMUSCULAR; INTRAVENOUS
Status: DISCONTINUED | OUTPATIENT
Start: 2020-09-24 | End: 2020-09-24

## 2020-09-24 RX ADMIN — FENTANYL CITRATE 50 MCG: 0.05 INJECTION, SOLUTION INTRAMUSCULAR; INTRAVENOUS at 14:51

## 2020-09-24 RX ADMIN — ACETAMINOPHEN 1000 MG: 500 TABLET, FILM COATED ORAL at 20:09

## 2020-09-24 RX ADMIN — TRANEXAMIC ACID 1000 MG: 100 INJECTION, SOLUTION INTRAVENOUS at 11:39

## 2020-09-24 RX ADMIN — TRANEXAMIC ACID 1000 MG: 100 INJECTION, SOLUTION INTRAVENOUS at 12:27

## 2020-09-24 RX ADMIN — PRAVASTATIN SODIUM 20 MG: 20 TABLET ORAL at 20:09

## 2020-09-24 RX ADMIN — PANTOPRAZOLE SODIUM 40 MG: 40 TABLET, DELAYED RELEASE ORAL at 16:31

## 2020-09-24 RX ADMIN — SODIUM CHLORIDE, POTASSIUM CHLORIDE, SODIUM LACTATE AND CALCIUM CHLORIDE 9 ML/HR: 600; 310; 30; 20 INJECTION, SOLUTION INTRAVENOUS at 10:30

## 2020-09-24 RX ADMIN — ACETAMINOPHEN 1000 MG: 500 TABLET, FILM COATED ORAL at 16:31

## 2020-09-24 RX ADMIN — POLYETHYLENE GLYCOL 3350 17 G: 17 POWDER, FOR SOLUTION ORAL at 16:31

## 2020-09-24 RX ADMIN — FAMOTIDINE 20 MG: 20 TABLET, FILM COATED ORAL at 10:30

## 2020-09-24 RX ADMIN — METOPROLOL TARTRATE 25 MG: 25 TABLET, FILM COATED ORAL at 20:09

## 2020-09-24 RX ADMIN — MEPIVACAINE HYDROCHLORIDE 3.5 ML: 15 INJECTION, SOLUTION EPIDURAL; INFILTRATION at 11:34

## 2020-09-24 RX ADMIN — CETIRIZINE HYDROCHLORIDE 10 MG: 10 TABLET, FILM COATED ORAL at 20:09

## 2020-09-24 RX ADMIN — OXYCODONE 10 MG: 5 TABLET ORAL at 20:09

## 2020-09-24 RX ADMIN — LIDOCAINE HYDROCHLORIDE 0.2 ML: 10 INJECTION, SOLUTION EPIDURAL; INFILTRATION; INTRACAUDAL; PERINEURAL at 10:30

## 2020-09-24 RX ADMIN — PROPOFOL 100 MCG/KG/MIN: 10 INJECTION, EMULSION INTRAVENOUS at 11:30

## 2020-09-24 RX ADMIN — OXYCODONE 5 MG: 5 TABLET ORAL at 16:30

## 2020-09-24 RX ADMIN — SODIUM CHLORIDE 100 ML/HR: 9 INJECTION, SOLUTION INTRAVENOUS at 16:30

## 2020-09-24 RX ADMIN — PREGABALIN 75 MG: 75 CAPSULE ORAL at 10:30

## 2020-09-24 RX ADMIN — ONDANSETRON 4 MG: 2 INJECTION INTRAMUSCULAR; INTRAVENOUS at 12:09

## 2020-09-24 RX ADMIN — MELOXICAM 15 MG: 7.5 TABLET ORAL at 16:30

## 2020-09-24 RX ADMIN — DEXAMETHASONE SODIUM PHOSPHATE 4 MG: 4 INJECTION, SOLUTION INTRAMUSCULAR; INTRAVENOUS at 12:09

## 2020-09-24 RX ADMIN — ACETAMINOPHEN 1000 MG: 500 TABLET ORAL at 10:30

## 2020-09-24 RX ADMIN — BUPIVACAINE HYDROCHLORIDE 30 ML: 2.5 INJECTION, SOLUTION EPIDURAL; INFILTRATION; INTRACAUDAL; PERINEURAL at 13:05

## 2020-09-24 RX ADMIN — CEFAZOLIN SODIUM 2 G: 2 INJECTION, SOLUTION INTRAVENOUS at 20:10

## 2020-09-24 RX ADMIN — CEFAZOLIN SODIUM 2 G: 2 INJECTION, SOLUTION INTRAVENOUS at 11:27

## 2020-09-24 NOTE — H&P
Patient Care Team:      Chief complaint: right knee pain    Subjective:  Patient is a 65 y.o.female presents with a history of right knee pain.  She has had previous left total knee arthroplasty in the past and recovered well from that.  She denies any recent changes in her right knee or in her general health.    Review of Systems:  General ROS: negative  Cardiovascular ROS: no chest pain or dyspnea on exertion  Respiratory ROS: no cough, shortness of breath, or wheezing      Allergies:   Allergies   Allergen Reactions   • Augmentin [Amoxicillin-Pot Clavulanate] Nausea Only   • Bactrim [Sulfamethoxazole-Trimethoprim] Rash          Latex: no  Contrast Dye: no    Home Meds    Medications Prior to Admission   Medication Sig Dispense Refill Last Dose   • dexlansoprazole (DEXILANT) 60 MG capsule Take 60 mg by mouth Daily.   9/24/2020 at 0700   • FLUoxetine (PROzac) 20 MG capsule Take 20 mg by mouth Daily.   9/24/2020 at 0700   • levocetirizine (XYZAL) 5 MG tablet Take 5 mg by mouth Every Evening.   9/23/2020 at 2100   • metoprolol tartrate (LOPRESSOR) 25 MG tablet Take 25 mg by mouth 2 (Two) Times a Day.   9/24/2020 at 0700   • NON FORMULARY 1 each Daily. CBD gel cap, 25mg daily   Past Week at Unknown time   • pentosan polysulfate (ELMIRON) 100 MG capsule Take 100 mg by mouth 2 (Two) Times a Day.   9/23/2020 at 2100   • Polyethylene Glycol 3350 (MIRALAX PO) Take 1 dose by mouth Daily.   9/23/2020 at 0700   • pravastatin (PRAVACHOL) 20 MG tablet Take 20 mg by mouth Daily.   9/23/2020 at 2100   • temazepam (RESTORIL) 30 MG capsule Take 30 mg by mouth At Night As Needed for Sleep.   Past Month at Unknown time   • aspirin 81 MG EC tablet Take 1 tablet by mouth Daily. Resume in 1 month   9/17/2020     PMH:   Past Medical History:   Diagnosis Date   • Arthritis    • Clostridium difficile infection 07/2018   • Depression    • GERD (gastroesophageal reflux disease)    • High cholesterol    • Hypertension    • Interstitial  cystitis    • LBBB (left bundle branch block)    • Neck pain    • OAB (overactive bladder)    • Osteoporosis    • Squamous cell carcinoma in situ     REMOVED FROM BOTH SHOULDERS, LUE, RLE, AND NOSE    • Tachycardia     Metoprolol to regulate HR   • TIA (transient ischemic attack) 2011    NO RESIDUAL PROBLEMS    • Wears reading eyeglasses      PSH:    Past Surgical History:   Procedure Laterality Date   • ANTERIOR CERVICAL DISCECTOMY W/ FUSION N/A 11/24/2017    Procedure: CERVICAL DISCECTOMY ANTERIOR WITH FUSION C6-7 CAGE PLATE ALLOGRAFT;  Surgeon: Sankte Murrell MD;  Location:  [facility];  Service:    • BLADDER SUSPENSION  2018   • CATARACT EXTRACTION  2017   • COLONOSCOPY  2016   • ENDOSCOPY     • FINGER SURGERY Right     THIRD AND FOURTH FINGER RIGHT HAND    • HYSTERECTOMY  1986   • LASIK     • TOTAL KNEE ARTHROPLASTY Left 10/17/2019    Procedure: TOTAL KNEE REPLACEMENT LEFT;  Surgeon: Grant Diez MD;  Location:  [facility];  Service: Orthopedics     Immunization History: pneumo: no   Flu: yes  Tetanus: no  Social History:   Tobacco: Former   Alcohol: no      Physical Exam:There were no vitals taken for this visit.      General Appearance:    Alert, cooperative, no distress, appears stated age   Head:    Normocephalic, without obvious abnormality, atraumatic   Lungs:     Clear to auscultation bilaterally, respirations unlabored    Heart: Regular rate and rhythm, S1 and S2 normal, no murmur, rub    or gallop    Abdomen:    Soft without tenderness   Breast Exam:    deferred   Genitalia:    deferred   Extremities:   Extremities normal, atraumatic, no cyanosis or edema   Skin:   Skin color, texture, turgor normal, no rashes or lesions   Neurologic:   Grossly intact     Results Review: CBC, Chem profile, EKG, CXR on chart.  Covid-neg     Impression: Osteoarthritis right knee    Plan: For right total knee arthroplasty  today  SIOMARA Solis 9/24/2020 10:21 EDT

## 2020-09-24 NOTE — ANESTHESIA PROCEDURE NOTES
Peripheral Block    Pre-sedation assessment completed: 9/24/2020 1:00 PM    Patient reassessed immediately prior to procedure    Patient location during procedure: post-op  Start time: 9/24/2020 1:00 PM  Reason for block: at surgeon's request and post-op pain management  Performed by  CRNA: Kfoi Thomas CRNA  Assisted by: Linda Mcmullen RN  Preanesthetic Checklist  Completed: patient identified, site marked, surgical consent, pre-op evaluation, timeout performed, IV checked, risks and benefits discussed and monitors and equipment checked  Prep:  Pt Position: supine  Sterile barriers:cap, gloves, mask and sterile barriers  Prep: ChloraPrep  Patient monitoring: blood pressure monitoring, continuous pulse oximetry and EKG  Procedure  Performed under: spinal  Guidance:ultrasound guided  Images:still images obtained, printed/placed on chart    Laterality:right  Block Type:adductor canal block  Injection Technique:catheter  Needle Type:Tuohy and echogenic  Needle Gauge:18 G  Resistance on Injection: none  Catheter Size:20 G (20g)  Cath Depth at skin: 9 cm    Medications Used: bupivacaine PF (MARCAINE) 0.25 % injection, 30 mL  Med admintered at 9/24/2020 1:05 PM      Medications  Preservative Free Saline:5ml    Post Assessment  Injection Assessment: negative aspiration for heme, incremental injection and no paresthesia on injection  Patient Tolerance:comfortable throughout block  Complications:no  Additional Notes  Procedure:             The pt was placed in the Supine position.  The Insertion site was  prepped and Draped in sterile fashion.  The pt was anesthetized with  IV Sedation( see meds).  Skin and cutaneous tissue was infiltrated and anesthetized with 1% Lidocaine 3 mls via a 25g needle.  A BBraun 4 inch 18g echogenic needle was then  inserted approximately midline, mid-thigh and advanced In-plane with Ultrasound guidance.  Normal Saline PSF was utilized for hydrodissection of tissue.  The Vastus medialis  and Sartorius muscle where visualized and the needle tip was placed in the adductor canal,  lateral to the femoral artery.  LA injection spread was visualized, injection was incremental 1-5ml, injection pressure was normal or little, no intraneural injection, no vascular injection.  LA dose was injected thru the needle(see dose above).  A BBraun 20g wire stylet catheter was placed via the needle with ultrasound visualization and confirmation with NS fluid bolus. The labeled Catheter was then secured to skin at insertion site with skin afix and steristrips to curled catheter and CHG transparent dressing.  Thank you.

## 2020-09-24 NOTE — ANESTHESIA PROCEDURE NOTES
Spinal Block      Patient reassessed immediately prior to procedure    Patient location during procedure: OR  Start Time: 9/24/2020 11:32 AM  Indication:at surgeon's request  Performed By  CRNA: Kofi Thomas CRNA  Preanesthetic Checklist  Completed: patient identified, site marked, surgical consent, pre-op evaluation, timeout performed, IV checked, risks and benefits discussed and monitors and equipment checked  Spinal Block Prep:  Patient Position:sitting  Sterile Tech:cap, gloves, sterile barriers and mask  Prep:Chloraprep  Patient Monitoring:blood pressure monitoring, continuous pulse oximetry and EKG  Spinal Block Procedure  Approach:midline  Guidance:landmark technique and palpation technique  Location:L4-L5  Needle Type:Nathaniel (Introducer:   no)  Needle Gauge:25 G  Placement of Spinal needle event:cerebrospinal fluid aspirated  Paresthesia: no  Fluid Appearance:clear  Medications: mepivacaine (CARBOCAINE) 1.5 % injection, 3.5 mL  Med Administered at 9/24/2020 11:34 AM   Post Assessment  Patient Tolerance:patient tolerated the procedure well with no apparent complications  Complications no  Additional Notes  Procedure:  Pt assisted to sitting position, with legs in position of comfort over side of bed.  Pt. instructed in optimal spine presentation, the spine was prepped/ Draped and the skin at insertion site was anesthetized with 1% Lidocaine 2 ml.  The spinal needle was then advanced until CSF flow was obtained and LA was injected:

## 2020-09-24 NOTE — OP NOTE
Preoperative diagnosis:Right knee end stage osteoarthritis    Postoperative diagnosis: Right knee end stage osteoarthritis    Operative procedure: Right total knee arthroplasty    Surgeon: Dr. Sekou Diez    Assistant: GILLIAN Uribe.  Necessary during the case for positioning of the patient, exposure, implantation of components and closure.    Anesthesia: Spinal with local and adductor canal catheter    Estimated blood loss: Minimal    Implants: [employer]  posterior stabilized total knee arthroplasty size 4 narrow femur, 2 tibia, 29 thin patella, 9 poly    Tourniquet time: 55 minutes at 300 mmHg    Indications for procedure: Ирина is a very pleasant 65-year-old female who has struggled with end-stage activity limiting right knee pain secondary to osteoarthritis.  X-rays in August revealed severe tricompartmental degenerative changes in a varus knee with complete loss of medial joint space and marginal osteophyte formation.  They have failed exhaustive conservative treatment measures including cortisone injections, viscosupplementation injections in January and February of this year and physical therapy.  Her left knee is doing well status post left total knee arthroplasty October 2019.  She takes ibuprofen as needed for pain.  After undergoing a shared decision-making process they agreed to proceed with right total knee arthroplasty.  Surgical consent form was signed.    Description of procedure: The patient was seen in the preoperative holding area and the right leg was signed to confirm the correct operative site.  They were seen by anesthesia.  They received Ancef 2 g IV prophylactic antibiotics within 1 hour of incision time.  They were brought back to the operating room.  Spinal was performed without difficulty and they were given sedation throughout the case.  Patient placed in the supine position and all of  bony prominences were well-padded.  A bump was placed underneath the right hip.  Nonsterile  tourniquet was applied to the thigh.  The right lower extremity was prepped and draped in the usual sterile fashion and timeout was performed to confirm right total knee arthroplasty for patient Ирина Gutierrez.    The right lower extremity was exsanguinated with an Esmarch.  Tourniquet was inflated to 300 mmHg.  With the knee flexed a midline incision was made with a 10 blade scalpel.  Adequate hemostasis maintained with Bovie electrocautery.  Full-thickness medial and lateral flaps were elevated.  Using a fresh 10 blade scalpel I made a medial parapatellar arthrotomy.  The patella was everted.  Patella fat pad and anterior femoral fat pads were excised.  Marginal osteophytes were removed.  Medial release was performed for this varus knee using Bovie electrocautery.  Cabarrus's line was marked.  Z retractors were placed medially and laterally.  The distal femur was then drilled and intramedullary distal femoral cutting guide was pinned in place set at a 5° valgus cut for a 9.5 mm cut.  This cut was then made with the oscillating saw.  The femur was then sized to a size 4 set at 3° of external rotation.  4-in-1 cutting guide was pinned in place.  Anterior and posterior cuts were made as were the chamfer cuts.  Cut bone was removed.  We then turned our attention to the tibia.    The tibia was subluxed anteriorly. PCL retractor was placed as were medial and lateral Hohmann retractors.  We then used the extramedullary tibial cutting guide set at 3° posterior slope for the proximal tibial cut.  5 mm of bone was removed from the low medial side and a centimeter from the high lateral side.  Medial and lateral menisci were then excised as were posterior osteophytes.  Flexion and extension gaps were then checked and with a 9 mm block we achieved full extension and flexion with excellent alignment. The tibia was sized to a 2 tibial tray centered off the medial third of the tibial tubercle.  The tray was pinned in place.  We  then impacted the tibial fins.  Size 4 trial femur was then placed and we made the box cut with the reamer and punch. We trialed with a size 9 poly, 4 femur and 2 tibia.  With these trial components in place we achieved full extension and flexion with excellent alignment and stable throughout.  Lug holes for the femur were drilled.    We then turned our attention to the patella.  Patella was sized to 20 mm in thickness and we made a 7mm patellar cut with the reciprocal saw.  A 29 thin trial was placed and the patella drill holes were made.  With the trial button in place there was excellent tracking with the no thumbs technique.    Trial components were then removed.  Final components were opened on the back table.  Posterior capsule was injected with 20 cc of half percent Ropivicaine and 5 mg of morphine.  Cement was mixed on the back table.  The knee was copiously irrigated with Pulsavac lavage.  The knee was thoroughly dried and a bone plug was placed in the distal femoral drill hole.  Cement was then applied to the tibia and final size 2 tibial tray was impacted in place and excess cement was removed.  Cement was applied to the femur and final size 4 narrow femur was impacted in place and excess cement removed.  We then placed a 9 mm poly-this was seen to be fully seated in the tibial tray.  Knee was then placed in extension and we applied cement to the cut patellar surface and 29 thin patella was held in place with patellar clamp.  All excess cement was removed.  The knee was left in extension while cement cured.    Once the cement was fully cured we irrigated the knee with diluted betadine solution and Pulsavac lavage.  The knee was taken through full range of motion and seen to achieve full extension and flexion with excellent patellar tracking.    We then proceeded with closure.  The deep fascia was closed with a #1 Stratafix barbed suture.  Dermis was closed with 2-0 Vicryl.  Skin was closed with a running  3-0 Stratafix barbed subcuticular suture.  A sterile dressing was then applied with Pirineo mesh dressing and Dermabond.  We then applied 4 x 4's, ABDs, soft roll and a six-inch Ace bandage.    Tourniquet was deflated at 55 minutes.  Patient was transferred to recovery in stable condition.  All sponge and needle counts were correct ×2.  Patient received first dose of TXA just prior to incision and the second dose 5-10 minutes prior to letting down the tourniquet to minimize bleeding.    Postoperative plan:  Patient will be admitted to Dr. ROCK for medical management  Mobilize starting today with PT/OT as tolerated  Start Aspirin for DVT prophylaxis tomorrow   consult for home physical therapy and home equipment needs  Follow-up with me in 2-3 weeks.    Grant Diez MD  09/24/20  12:56 EDT

## 2020-09-24 NOTE — THERAPY EVALUATION
Patient Name: Ирина Gutierrez  : 1955    MRN: [id number]                              Today's Date: 2020       Admit Date: 2020    Visit Dx: No diagnosis found.  Patient Active Problem List   Diagnosis   • Herniated disc, cervical, s/p ACDF   • Hyperlipidemia   • Primary osteoarthritis of left knee   • Status post total right knee replacement   • HTN (hypertension)   • Primary localized osteoarthritis of right knee     Past Medical History:   Diagnosis Date   • Arthritis    • Clostridium difficile infection 2018   • Depression    • GERD (gastroesophageal reflux disease)    • High cholesterol    • Hypertension    • Interstitial cystitis    • LBBB (left bundle branch block)    • Neck pain    • OAB (overactive bladder)    • Osteoporosis    • Squamous cell carcinoma in situ     REMOVED FROM BOTH SHOULDERS, LUE, RLE, AND NOSE    • Tachycardia     Metoprolol to regulate HR   • TIA (transient ischemic attack) 2011    NO RESIDUAL PROBLEMS    • Wears reading eyeglasses      Past Surgical History:   Procedure Laterality Date   • ANTERIOR CERVICAL DISCECTOMY W/ FUSION N/A 2017    Procedure: CERVICAL DISCECTOMY ANTERIOR WITH FUSION C6-7 CAGE PLATE ALLOGRAFT;  Surgeon: Sanket Murrell MD;  Location:  [facility];  Service:    • BLADDER SUSPENSION     • CATARACT EXTRACTION     • COLONOSCOPY     • ENDOSCOPY     • FINGER SURGERY Right     THIRD AND FOURTH FINGER RIGHT HAND    • HYSTERECTOMY     • LASIK     • TOTAL KNEE ARTHROPLASTY Left 10/17/2019    Procedure: TOTAL KNEE REPLACEMENT LEFT;  Surgeon: Grant Diez MD;  Location:  [facility];  Service: Orthopedics     General Information     Row Name 20 1545          Physical Therapy Time and Intention    Document Type  evaluation  -CHANDANA     Mode of Treatment  individual therapy;physical therapy  -CHANDANA     Row Name 20 1545          General Information    Prior Level of Function  min assist:;bed mobility;ADL's;transfer;all  household mobility  -     Existing Precautions/Restrictions  fall;other (see comments) R adductor nerve catheter  -     Barriers to Rehab  none identified  -     Row Name 09/24/20 1545          Living Environment    Lives With  spouse  -     Row Name 09/24/20 1545          Home Main Entrance    Number of Stairs, Main Entrance  one  -CHANDANA     Stair Railings, Main Entrance  none  -CHANDANA     Row Name 09/24/20 1545          Stairs Within Home, Primary    Stairs, Within Home, Primary  0  -CHANDANA     Number of Stairs, Within Home, Primary  none  -CHANDANA     Row Name 09/24/20 1545          Cognition    Orientation Status (Cognition)  oriented x 4  -CHANDANA     Row Name 09/24/20 1545          Safety Issues, Functional Mobility    Safety Issues Affecting Function (Mobility)  safety precautions follow-through/compliance;safety precaution awareness  -     Impairments Affecting Function (Mobility)  endurance/activity tolerance;strength;pain  -       User Key  (r) = Recorded By, (t) = Taken By, (c) = Cosigned By    Initials Name Provider Type    CHANDANA Abelino Chicas, PT Physical Therapist        Mobility     Row Name 09/24/20 1545          Bed Mobility    Bed Mobility  scooting/bridging;supine-sit  -     Scooting/Bridging [place] (Bed Mobility)  verbal cues;contact guard  -     Supine-Sit [place] (Bed Mobility)  verbal cues;contact guard  -     Assistive Device (Bed Mobility)  bed rails;head of bed elevated  -     Comment (Bed Mobility)  Verbal cues for LE sequencing off of EOB and use of UEs to push trunk into sitting  -     Row Name 09/24/20 1545          Transfers    Comment (Transfers)  Verbal cues for safe hand placement during standing/sitting and moving R LE out for comfort prior to sitting  -     Row Name 09/24/20 1545          Sit-Stand Transfer    Sit-Stand [place] (Transfers)  verbal cues;contact guard;2 person assist  -     Assistive Device (Sit-Stand Transfers)  walker, front-wheeled  -     Row  Name 09/24/20 1545          Gait/Stairs (Locomotion)    [place] Level (Gait)  verbal cues;contact guard;2 person assist  -CHANDANA     Assistive Device (Gait)  walker, front-wheeled  -     Distance in Feet (Gait)  40 feet  -     Deviations/Abnormal Patterns (Gait)  bilateral deviations;patrick decreased;gait speed decreased;antalgic;stride length decreased  -     Right Sided Gait Deviations  heel strike decreased;weight shift ability decreased  -     [place] Level (Stairs)  not tested  -     Comment (Gait/Stairs)  Pt ambulated with step to pattern and decreased speed. Verbal cues for maintaining upright posture, body within walker, increase step length, WB on R LE, and heel strike, and decrease WB through UEs. No knee buckling noted. Gait limited by pain, R notified.  -     Row Name 09/24/20 1545          Mobility    Extremity Weight-bearing Status  right lower extremity  -     Right Lower Extremity (Weight-bearing Status)  weight-bearing as tolerated (WBAT)  -       User Key  (r) = Recorded By, (t) = Taken By, (c) = Cosigned By    Initials Name Provider Type    CHANDANA Abelino Chicas, PT Physical Therapist        Obj/Interventions     Row Name 09/24/20 1545          Range of Motion Comprehensive    General Range of Motion  lower extremity range of motion deficits identified  -     Comment, General Range of Motion  R LE AROM impaired 25%; L LE AROM WFL; able to actively DF/PF  -     Row Name 09/24/20 1545          Strength Comprehensive (MMT)    General Manual Muscle Testing (MMT) Assessment  lower extremity strength deficits identified  -     Comment, General Manual Muscle Testing (MMT) Assessment  R LE functionally 4-/5; L L functionally 4+/5; IND with SLR  -     Row Name 09/24/20 1545          Motor Skills    Therapeutic Exercise  hip;knee;ankle  -     Row Name 09/24/20 1545          Hip (Therapeutic Exercise)    Hip (Therapeutic Exercise)  isometric exercises  -     Hip Isometrics  (Therapeutic Exercise)  gluteal sets;10 repetitions  -     Row Name 09/24/20 1545          Knee (Therapeutic Exercise)    Knee (Therapeutic Exercise)  isometric exercises  -     Knee Isometrics (Therapeutic Exercise)  quad sets;10 repetitions  -     Row Name 09/24/20 1545          Ankle (Therapeutic Exercise)    Ankle (Therapeutic Exercise)  AROM (active range of motion)  -     Ankle AROM (Therapeutic Exercise)  bilateral;dorsiflexion;plantarflexion;10 repetitions  -     Row Name 09/24/20 1545          Sensory Assessment (Somatosensory)    Sensory Assessment (Somatosensory)  sensation intact  -       User Key  (r) = Recorded By, (t) = Taken By, (c) = Cosigned By    Initials Name Provider Type    CHANDANA Abelino Chicas, PT Physical Therapist        Goals/Plan     Row Name 09/24/20 1545          Bed Mobility Goal 1 (PT)    Activity/Assistive Device (Bed Mobility Goal 1, PT)  sit to supine/supine to sit  -CHANDANA     [place] Level/Cues Needed (Bed Mobility Goal 1, PT)  modified independence  -CHANDANA     Time Frame (Bed Mobility Goal 1, PT)  long term goal (LTG);3 days  -CHANDANA     Row Name 09/24/20 1545          Transfer Goal 1 (PT)    Activity/Assistive Device (Transfer Goal 1, PT)  sit-to-stand/stand-to-sit;walker, rolling  -CHANDANA     [place] Level/Cues Needed (Transfer Goal 1, PT)  modified independence  -CHANDANA     Time Frame (Transfer Goal 1, PT)  long term goal (LTG);3 days  -     Row Name 09/24/20 1545          Gait Training Goal 1 (PT)    Activity/Assistive Device (Gait Training Goal 1, PT)  gait (walking locomotion);walker, rolling  -CHANDANA     [place] Level (Gait Training Goal 1, PT)  modified independence  -CHANDANA     Distance (Gait Training Goal 1, PT)  150 feet  -CHANDANA     Time Frame (Gait Training Goal 1, PT)  long term goal (LTG);3 days  -CHANDANA     Row Name 09/24/20 1540          ROM Goal 1 (PT)    ROM Goal 1 (PT)  R knee AROM 0-90 degrees  -CHANDANA     Time Frame (ROM Goal 1, PT)  long-term goal (LTG);3 days  -     Row  Name 09/24/20 1545          Stairs Goal 1 (PT)    Activity/Assistive Device (Stairs Goal 1, PT)  stairs, all skills;walker, rolling  -CHANADNA     [place] Level/Cues Needed (Stairs Goal 1, PT)  contact guard assist  -CHANDANA     Number of Stairs (Stairs Goal 1, PT)  1 backwards technique  -CHANDANA     Time Frame (Stairs Goal 1, PT)  long term goal (LTG);3 days  -CHANDANA       User Key  (r) = Recorded By, (t) = Taken By, (c) = Cosigned By    Initials Name Provider Type    Abelino Santos, PT Physical Therapist        Clinical Impression     Row Name 09/24/20 1545          Pain    Additional Documentation  Pain Scale: Numbers Pre/Post-Treatment (Group)  -     Row Name 09/24/20 1545          Pain Scale: Numbers Pre/Post-Treatment    Pretreatment Pain Rating  8/10  -CHANDANA     Posttreatment Pain Rating  9/10  -CHANDANA     Pain Location - Side  Right  -CHANDANA     Pain Location - Orientation  anterior  -CHANDANA     Pain Location  knee  -CHANDANA     Pain Intervention(s)  Cold applied;Repositioned;Ambulation/increased activity  -     Row Name 09/24/20 1545          Therapy Assessment/Plan (PT)    Patient/Family Therapy Goals Statement (PT)  To return home  -CHANDANA     Rehab Potential (PT)  good, to achieve stated therapy goals  -CHANDANA     Criteria for Skilled Interventions Met (PT)  yes;meets criteria;skilled treatment is necessary  -     Row Name 09/24/20 1545          Positioning and Restraints    Pre-Treatment Position  in bed  -CHANDANA     Post Treatment Position  chair  -CHANDANA     In Chair  notified nsg;reclined;call light within reach;encouraged to call for assist;exit alarm on;with family/caregiver;legs elevated;compression device  -CHANDANA       User Key  (r) = Recorded By, (t) = Taken By, (c) = Cosigned By    Initials Name Provider Type    Abelino Santos, PT Physical Therapist        Outcome Measures     Row Name 09/24/20 5436          How much help from another person do you currently need...    Turning from your back to your side while in flat bed without using  bedrails?  3  -CHANDANA     Moving from lying on back to sitting on the side of a flat bed without bedrails?  3  -CHANDANA     Moving to and from a bed to a chair (including a wheelchair)?  3  -CHANDANA     Standing up from a chair using your arms (e.g., wheelchair, bedside chair)?  3  -CHANDANA     Climbing 3-5 steps with a railing?  2  -CHANDANA     To walk in hospital room?  3  -CHANDANA     AM-PAC 6 Clicks Score (PT)  17  -     Row Name 09/24/20 1545          PADD    Diagnosis  1  -CHANDANA     Gender  1  -CHANDANA     Age Group  2  -CHANDANA     Gait Distance  0  -CHANDANA     Assist Level  1  -CHANDANA     Home Support  3  -CHANDANA     PADD Score  8  -CHANDANA     Patient Preference  home with home health  -CHANDANA     Prediction by PADD Score  directly home (with home health or out-patient rehab)  -     Row Name 09/24/20 1545          Functional Assessment    Outcome Measure Options  AM-PAC 6 Clicks Basic Mobility (PT);PADD  -CHANDANA       User Key  (r) = Recorded By, (t) = Taken By, (c) = Cosigned By    Initials Name Provider Type    Abelino Santos, PT Physical Therapist        Physical Therapy Education                 Title: PT OT SLP Therapies (In Progress)     Topic: Physical Therapy (Done)     Point: Mobility training (Done)     Learning Progress Summary           Patient Acceptance, E,D, VU by CHANDANA at 9/24/2020 1545    Comment: Educated on safe sequencing with bed mobility, ambulatory transfers, and gait. Reviewed HEP and knee precautions.   Significant Other Acceptance, E,D, VU by CHANDANA at 9/24/2020 1545    Comment: Educated on safe sequencing with bed mobility, ambulatory transfers, and gait. Reviewed HEP and knee precautions.                   Point: Home exercise program (Done)     Learning Progress Summary           Patient Acceptance, E,D, VU by CHANDANA at 9/24/2020 1545    Comment: Educated on safe sequencing with bed mobility, ambulatory transfers, and gait. Reviewed HEP and knee precautions.   Significant Other Acceptance, E,D, VU by CHANDANA at 9/24/2020 1545    Comment: Educated on safe  sequencing with bed mobility, ambulatory transfers, and gait. Reviewed HEP and knee precautions.                   Point: Body mechanics (Done)     Learning Progress Summary           Patient Acceptance, E,D, VU by CHANDANA at 9/24/2020 1545    Comment: Educated on safe sequencing with bed mobility, ambulatory transfers, and gait. Reviewed HEP and knee precautions.   Significant Other Acceptance, E,D, VU by CHANDANA at 9/24/2020 1545    Comment: Educated on safe sequencing with bed mobility, ambulatory transfers, and gait. Reviewed HEP and knee precautions.                   Point: Precautions (Done)     Learning Progress Summary           Patient Acceptance, E,D, VU by CHANDANA at 9/24/2020 1545    Comment: Educated on safe sequencing with bed mobility, ambulatory transfers, and gait. Reviewed HEP and knee precautions.   Significant Other Acceptance, E,D, VU by CHANDANA at 9/24/2020 1545    Comment: Educated on safe sequencing with bed mobility, ambulatory transfers, and gait. Reviewed HEP and knee precautions.                               User Key     Initials Effective Dates Name Provider Type Discipline     09/10/19 -  Abelino Chicas, PT Physical Therapist PT              PT Recommendation and Plan  Planned Therapy Interventions (PT): balance training, bed mobility training, gait training, home exercise program, patient/family education, strengthening, stair training, ROM (range of motion), transfer training  Plan of Care Reviewed With: patient, spouse  Progress: improving  Outcome Summary: PT eval complete. Pt ambulated 40 feet using RW and CGA x2. Gait limited by increased pain. Bed mobility and STS performed with CGA x2. No knee buckling noted with ambulation. Pt IND with SLR. Will assess R knee AROM POD#1. Reviewed HEP and knee precautions. PADD score = 8. Recommend pt d/c home with assist and HHPT when appropriate.     Time Calculation:   PT Charges     Row Name 09/24/20 1545             Time Calculation    Start Time  1545  -CHANDANA       PT Received On  09/24/20  -CHANDANA      PT Goal Re-Cert Due Date  10/04/20  -         Time Calculation- PT    Total Timed Code Minutes- PT  8 minute(s)  -CHANDANA         Timed Charges    52233 - PT Therapeutic Exercise Minutes  2  -CHANDANA      98994 - Gait Training Minutes   6  -CHANDANA        User Key  (r) = Recorded By, (t) = Taken By, (c) = Cosigned By    Initials Name Provider Type    Abelino Santos, PT Physical Therapist        Therapy Charges for Today     Code Description Service Date Service Provider Modifiers Qty    98432681605 HC GAIT TRAINING EA 15 MIN 9/24/2020 Abelino Chicas, PT GP 1    93143472625 HC PT EVAL LOW COMPLEXITY 3 9/24/2020 Abelino Chicas, PT GP 1    32749007069 HC PT THER SUPP EA 15 MIN 9/24/2020 Abelino Chicas, PT GP 2          PT G-Codes  Outcome Measure Options: AM-PAC 6 Clicks Basic Mobility (PT), PADD  AM-PAC 6 Clicks Score (PT): 17    Abelino Chicas PT  9/24/2020

## 2020-09-24 NOTE — ANESTHESIA PREPROCEDURE EVALUATION
Anesthesia Evaluation     Patient summary reviewed and Nursing notes reviewed   no history of anesthetic complications:  NPO Solid Status: > 8 hours  NPO Liquid Status: < 2 hours           Airway   Mallampati: III  TM distance: >3 FB  Neck ROM: limited  Possible difficult intubation  Dental - normal exam     Pulmonary - normal exam   (+) a smoker (3 yrs ago ) Former,   (-) COPD, asthma, shortness of breath, recent URI  Cardiovascular - normal exam  Exercise tolerance: good (4-7 METS)    ECG reviewed  Patient on routine beta blocker    (+) hypertension, dysrhythmias (LBBB : B blockers for tachycardia ), hyperlipidemia,   (-) past MI, angina, cardiac stents    ROS comment: Normal sinus rhythm  Left axis deviation  Left bundle branch block    Neuro/Psych  (+) TIA (remote (eye sx's) - no cause found despite w/u), psychiatric history,     (-) seizures, CVA  GI/Hepatic/Renal/Endo    (+)  GERD,    (-) liver disease, no renal disease, diabetes    Musculoskeletal     (+) neck pain (SP ACD ),   Abdominal    Substance History      OB/GYN          Other   arthritis,          Phys Exam Other: Mac3 with IIa view                 Anesthesia Plan    ASA 3     spinal, MAC and regional   (Propofol sedation    ACB/cath  post op.   )  intravenous induction     Anesthetic plan, all risks, benefits, and alternatives have been provided, discussed and informed consent has been obtained with: patient.    Plan discussed with CRNA.

## 2020-09-24 NOTE — PLAN OF CARE
Problem: Adult Inpatient Plan of Care  Goal: Plan of Care Review  Flowsheets (Taken 9/24/2020 9398)  Progress: improving  Plan of Care Reviewed With:   patient   spouse  Outcome Summary: PT eval complete. Pt ambulated 40 feet using RW and CGA x2. Gait limited by increased pain. Bed mobility and STS performed with CGA x2. No knee buckling noted with ambulation. Pt IND with SLR. Will assess R knee AROM POD#1. Reviewed HEP and knee precautions. PADD score = 8. Recommend pt d/c home with assist and HHPT when appropriate.

## 2020-09-24 NOTE — BRIEF OP NOTE
TOTAL KNEE ARTHROPLASTY  Progress Note    Ирина Gutierrez  9/24/2020    Pre-op Diagnosis:   right knee osteoarthritis       Post-Op Diagnosis Codes:     * Primary localized osteoarthritis of right knee [M17.11]    Procedure/CPT® Codes:  [place] TOTAL KNEE ARTHROPLASTY [61802]      Procedure(s):  TOTAL KNEE ARTHROPLASTY RIGHT    Surgeon(s):  Grant Diez MD     Assistant: GILLIAN Uribe    Anesthesia: Spinal, local and adductor canal catheter  Staff:   Circulator: Shannon Caba RN; Lula Fontana RN  Scrub Person: Gerald Gaffney  Vendor Representative: Tre Leiva  Nursing Assistant: Zainab Alcaraz  Assistant: Arun Toledo RNFA  [place]: Brooks Boo  Assistant: Arun Toledo RNFA      Estimated Blood Loss: minimal    Urine Voided: * No values recorded between 9/24/2020 11:27 AM and 9/24/2020 12:51 PM *    Specimens:                None          Drains: * No LDAs found *    Findings: Right knee severe tricompartmental degenerative changes    Complications: None    Implants: [employer] posterior stabilized total knee arthroplasty with a size 4 narrow femur, 2 tibia, 9 polyethylene and 29 thin patella    Tourniquet time: 55 minutes at 300 mmHg    Assistant: Arun Toledo RNFA  was responsible for performing the following activities: Retraction, implantation of components and closure and their skilled assistance was necessary for the success of this case.    Grant Diez MD     Date: 9/24/2020  Time: 12:55 EDT

## 2020-09-24 NOTE — PLAN OF CARE
Goal Outcome Evaluation:  Plan of Care Reviewed With: patient, spouse  Progress: no change   Pt received from PACU, pt up in chair during shift, will continue to monitor

## 2020-09-24 NOTE — H&P
Patient Name: Ирина Gutierrez  MRN: [id number]  : 1955  DOS: 2020    Attending: Grant Diez,*    Primary Care Provider: Preston Grant MD      Chief complaint: Right knee pain    Subjective   Patient is a pleasant 65 y.o. female presented for scheduled surgery by Dr. Diez.  She underwent right total knee arthroplasty under spinal anesthesia.  She tolerated surgery well and was admitted for further medical management.  Her knee has been painful for many years.  She denies use of assistive device for ambulation or recent falls.    She is known to us from previous admissions most recently 10/17/2019 for left knee replacement; which she recovered well.    When seen in PACU she is doing well.  She denies pain, but is still under effects of spinal block.  She denies nausea, shortness of breath or chest pain.  No history of DVT or PE.    (Above is noted, agree.  Doing fairly well when seen in her room afterwards.  Improving pain control.  Ambulated with PT earlier 40 feet with contact-guard assist of 2 and rolling walker.  No nausea vomiting or shortness of breath.  Currently resting in her bed.  Has tolerated p.o. diet, voided.)wy    Allergies:  Allergies   Allergen Reactions   • Tetanus Toxoids Other (See Comments)     PT stated she was allergy tested as a child and was told she had an allergy    • Augmentin [Amoxicillin-Pot Clavulanate] Nausea Only   • Bactrim [Sulfamethoxazole-Trimethoprim] Rash       Meds:  Medications Prior to Admission   Medication Sig Dispense Refill Last Dose   • dexlansoprazole (DEXILANT) 60 MG capsule Take 60 mg by mouth Daily.   2020 at 0700   • FLUoxetine (PROzac) 20 MG capsule Take 20 mg by mouth Daily.   2020 at 0700   • levocetirizine (XYZAL) 5 MG tablet Take 5 mg by mouth Every Evening.   2020 at 2100   • metoprolol tartrate (LOPRESSOR) 25 MG tablet Take 25 mg by mouth 2 (Two) Times a Day.   2020 at 0700   • NON FORMULARY 1 each  Daily. CBD gel cap, 25mg daily   Past Week at Unknown time   • pentosan polysulfate (ELMIRON) 100 MG capsule Take 100 mg by mouth 2 (Two) Times a Day.   9/23/2020 at 2100   • Polyethylene Glycol 3350 (MIRALAX PO) Take 1 dose by mouth Daily.   9/23/2020 at 0700   • pravastatin (PRAVACHOL) 20 MG tablet Take 20 mg by mouth Daily.   9/23/2020 at 2100   • temazepam (RESTORIL) 30 MG capsule Take 30 mg by mouth At Night As Needed for Sleep.   Past Month at Unknown time   • aspirin 81 MG EC tablet Take 1 tablet by mouth Daily. Resume in 1 month   9/17/2020         History:   Past Medical History:   Diagnosis Date   • Arthritis    • Clostridium difficile infection 07/2018   • Depression    • GERD (gastroesophageal reflux disease)    • High cholesterol    • Hypertension    • Interstitial cystitis    • LBBB (left bundle branch block)    • Neck pain    • OAB (overactive bladder)    • Osteoporosis    • Squamous cell carcinoma in situ     REMOVED FROM BOTH SHOULDERS, LUE, RLE, AND NOSE    • Tachycardia     Metoprolol to regulate HR   • TIA (transient ischemic attack) 2011    NO RESIDUAL PROBLEMS    • Wears reading eyeglasses      Past Surgical History:   Procedure Laterality Date   • ANTERIOR CERVICAL DISCECTOMY W/ FUSION N/A 11/24/2017    Procedure: CERVICAL DISCECTOMY ANTERIOR WITH FUSION C6-7 CAGE PLATE ALLOGRAFT;  Surgeon: Sanket Murrell MD;  Location:  [facility];  Service:    • BLADDER SUSPENSION  2018   • CATARACT EXTRACTION  2017   • COLONOSCOPY  2016   • ENDOSCOPY     • FINGER SURGERY Right     THIRD AND FOURTH FINGER RIGHT HAND    • HYSTERECTOMY  1986   • LASIK     • TOTAL KNEE ARTHROPLASTY Left 10/17/2019    Procedure: TOTAL KNEE REPLACEMENT LEFT;  Surgeon: Grant Diez MD;  Location:  [facility];  Service: Orthopedics     History reviewed. No pertinent family history.  Social History     Tobacco Use   • Smoking status: Former Smoker     Packs/day: 0.50     Years: 30.00     Pack years: 15.00     Types:  "Cigarettes     Quit date: 2015     Years since quittin.7   • Smokeless tobacco: Never Used   Substance Use Topics   • Alcohol use: No   • Drug use: No   She is  with no children. She is retired from office work.    Review of Systems  Pertinent items are noted in HPI, all other systems reviewed and negative    Vital Signs  /64   Pulse 52   Temp 97 °F (36.1 °C) (Temporal)   Resp 18   Ht 162.6 cm (64\")   Wt 82.1 kg (181 lb)   SpO2 100%   BMI 31.07 kg/m²     Physical Exam:    General Appearance:    Alert, cooperative, in no acute distress   Head:    Normocephalic, without obvious abnormality, atraumatic   Eyes:            Lids and lashes normal, conjunctivae and sclerae normal, no   icterus, no pallor, corneas clear,    Ears:    Ears appear intact with no abnormalities noted   Throat:   No oral lesions, no thrush, oral mucosa moist   Neck:   No adenopathy, supple, trachea midline, no thyromegaly    Lungs:     Clear to auscultation,respirations regular, even and unlabored    Heart:    Regular rhythm and normal rate, normal S1 and S2, no murmur, no gallop   Abdomen:     Normal bowel sounds, no masses, no organomegaly, soft non-tender, non-distended, no guarding, no rebound  tenderness   Genitalia:    Deferred   Extremities:  Right knee Ace wrap clean dry intact.  Nerve block present.   Pulses:   Pulses palpable and equal bilaterally   Skin:   No bleeding, bruising or rash   Neurologic:   Cranial nerves 2 - 12 grossly intact.  Lack of flexion and dorsiflexion intact bilateral feet due to lingering effects of spinal block  ( exam later with intact flexion and dorsiflexion bilateral feet)wy        I reviewed the patient's new clinical results.     Lab Results   Component Value Date    HGBA1C 5.50 09/10/2020     Results for KAROL BURGESS (MRN [id number]) as of 2020 13:00   Ref. Range 9/10/2020 12:16   Glucose Latest Ref Range: 65 - 99 mg/dL 97   Sodium Latest Ref Range: 136 - 145 mmol/L 136 "   Potassium Latest Ref Range: 3.5 - 5.2 mmol/L 4.6   CO2 Latest Ref Range: 22.0 - 29.0 mmol/L 24.0   Chloride Latest Ref Range: 98 - 107 mmol/L 102   Anion Gap Latest Ref Range: 5.0 - 15.0 mmol/L 10.0   Creatinine Latest Ref Range: 0.57 - 1.00 mg/dL 0.80   BUN Latest Ref Range: 8 - 23 mg/dL 15   BUN/Creatinine Ratio Latest Ref Range: 7.0 - 25.0  18.8   Calcium Latest Ref Range: 8.6 - 10.5 mg/dL 9.4   eGFR Non  Am Latest Ref Range: >60 mL/min/1.73 72   Hemoglobin A1C Latest Ref Range: 4.80 - 5.60 % 5.50   WBC Latest Ref Range: 3.40 - 10.80 10*3/mm3 5.83   RBC Latest Ref Range: 3.77 - 5.28 10*6/mm3 4.11   Hemoglobin Latest Ref Range: 12.0 - 15.9 g/dL 12.6   Hematocrit Latest Ref Range: 34.0 - 46.6 % 39.1   RDW Latest Ref Range: 12.3 - 15.4 % 13.1   MCV Latest Ref Range: 79.0 - 97.0 fL 95.1   MCH Latest Ref Range: 26.6 - 33.0 pg 30.7   MCHC Latest Ref Range: 31.5 - 35.7 g/dL 32.2   MPV Latest Ref Range: 6.0 - 12.0 fL 9.9   Platelets Latest Ref Range: 140 - 450 10*3/mm3 234       Assessment and Plan:     Status post total right knee replacement    Hyperlipidemia    HTN (hypertension)    Primary localized osteoarthritis of right knee      Plan  1. PT/OT- WBAT RLE  2. Pain control-prns, AC nerve block  3. IS-encourage  4. DVT proph- Mechs/ASA  5. Bowel regimen  6. Resume home medications as appropriate  7. Monitor post-op labs  8. DC planning for home    HTN, Hyperlipidemia  - Continue home Lopressor and statin  - Monitor BP   - Holding parameters for BP meds  - Labetalol PRN for SBP>170      RAJ Ramos  09/24/20  13:47 EDT     I have personally performed the evaluation on this patient. My history is consistent  with HPI obtained. My exam findings are listed above. I have personally reviewed and discussed the above formulated treatment plan with pt and [email or web address].

## 2020-09-25 VITALS
DIASTOLIC BLOOD PRESSURE: 58 MMHG | RESPIRATION RATE: 16 BRPM | BODY MASS INDEX: 30.9 KG/M2 | SYSTOLIC BLOOD PRESSURE: 112 MMHG | HEIGHT: 64 IN | TEMPERATURE: 97.8 F | HEART RATE: 71 BPM | OXYGEN SATURATION: 97 % | WEIGHT: 181 LBS

## 2020-09-25 PROBLEM — G89.18 ACUTE POSTOPERATIVE PAIN: Status: ACTIVE | Noted: 2020-09-25

## 2020-09-25 PROBLEM — D62 ACUTE BLOOD LOSS ANEMIA: Status: ACTIVE | Noted: 2020-09-25

## 2020-09-25 LAB
ANION GAP SERPL CALCULATED.3IONS-SCNC: 7 MMOL/L (ref 5–15)
BASOPHILS # BLD AUTO: 0.03 10*3/MM3 (ref 0–0.2)
BASOPHILS NFR BLD AUTO: 0.4 % (ref 0–1.5)
BUN SERPL-MCNC: 14 MG/DL (ref 8–23)
BUN/CREAT SERPL: 18.9 (ref 7–25)
CALCIUM SPEC-SCNC: 8.4 MG/DL (ref 8.6–10.5)
CHLORIDE SERPL-SCNC: 104 MMOL/L (ref 98–107)
CO2 SERPL-SCNC: 27 MMOL/L (ref 22–29)
CREAT SERPL-MCNC: 0.74 MG/DL (ref 0.57–1)
DEPRECATED RDW RBC AUTO: 46.2 FL (ref 37–54)
EOSINOPHIL # BLD AUTO: 0.05 10*3/MM3 (ref 0–0.4)
EOSINOPHIL NFR BLD AUTO: 0.7 % (ref 0.3–6.2)
ERYTHROCYTE [DISTWIDTH] IN BLOOD BY AUTOMATED COUNT: 13 % (ref 12.3–15.4)
GFR SERPL CREATININE-BSD FRML MDRD: 79 ML/MIN/1.73
GLUCOSE SERPL-MCNC: 94 MG/DL (ref 65–99)
HCT VFR BLD AUTO: 33.8 % (ref 34–46.6)
HGB BLD-MCNC: 10.4 G/DL (ref 12–15.9)
IMM GRANULOCYTES # BLD AUTO: 0.02 10*3/MM3 (ref 0–0.05)
IMM GRANULOCYTES NFR BLD AUTO: 0.3 % (ref 0–0.5)
LYMPHOCYTES # BLD AUTO: 1.89 10*3/MM3 (ref 0.7–3.1)
LYMPHOCYTES NFR BLD AUTO: 26.7 % (ref 19.6–45.3)
MCH RBC QN AUTO: 30 PG (ref 26.6–33)
MCHC RBC AUTO-ENTMCNC: 30.8 G/DL (ref 31.5–35.7)
MCV RBC AUTO: 97.4 FL (ref 79–97)
MONOCYTES # BLD AUTO: 0.86 10*3/MM3 (ref 0.1–0.9)
MONOCYTES NFR BLD AUTO: 12.2 % (ref 5–12)
NEUTROPHILS NFR BLD AUTO: 4.22 10*3/MM3 (ref 1.7–7)
NEUTROPHILS NFR BLD AUTO: 59.7 % (ref 42.7–76)
NRBC BLD AUTO-RTO: 0 /100 WBC (ref 0–0.2)
PLATELET # BLD AUTO: 187 10*3/MM3 (ref 140–450)
PMV BLD AUTO: 10.4 FL (ref 6–12)
POTASSIUM SERPL-SCNC: 3.8 MMOL/L (ref 3.5–5.2)
RBC # BLD AUTO: 3.47 10*6/MM3 (ref 3.77–5.28)
SODIUM SERPL-SCNC: 138 MMOL/L (ref 136–145)
WBC # BLD AUTO: 7.07 10*3/MM3 (ref 3.4–10.8)

## 2020-09-25 PROCEDURE — 97110 THERAPEUTIC EXERCISES: CPT

## 2020-09-25 PROCEDURE — 97165 OT EVAL LOW COMPLEX 30 MIN: CPT

## 2020-09-25 PROCEDURE — 25010000003 CEFAZOLIN IN DEXTROSE 2-4 GM/100ML-% SOLUTION: Performed by: ORTHOPAEDIC SURGERY

## 2020-09-25 PROCEDURE — 97535 SELF CARE MNGMENT TRAINING: CPT

## 2020-09-25 PROCEDURE — 80048 BASIC METABOLIC PNL TOTAL CA: CPT | Performed by: ORTHOPAEDIC SURGERY

## 2020-09-25 PROCEDURE — 97116 GAIT TRAINING THERAPY: CPT

## 2020-09-25 PROCEDURE — 85025 COMPLETE CBC W/AUTO DIFF WBC: CPT | Performed by: ORTHOPAEDIC SURGERY

## 2020-09-25 RX ORDER — DOCUSATE SODIUM 100 MG/1
100 CAPSULE, LIQUID FILLED ORAL 2 TIMES DAILY
Qty: 60 CAPSULE | Refills: 0 | Status: SHIPPED | OUTPATIENT
Start: 2020-09-25

## 2020-09-25 RX ORDER — OXYCODONE HYDROCHLORIDE 5 MG/1
5 TABLET ORAL EVERY 4 HOURS PRN
Qty: 40 TABLET | Refills: 0 | Status: SHIPPED | OUTPATIENT
Start: 2020-09-25 | End: 2020-09-25

## 2020-09-25 RX ORDER — OXYCODONE HYDROCHLORIDE 5 MG/1
5 TABLET ORAL EVERY 4 HOURS PRN
Qty: 50 TABLET | Refills: 0 | Status: SHIPPED | OUTPATIENT
Start: 2020-09-25 | End: 2020-10-04

## 2020-09-25 RX ORDER — ASPIRIN 81 MG/1
81 TABLET ORAL DAILY
Start: 2020-09-25

## 2020-09-25 RX ADMIN — MELOXICAM 15 MG: 7.5 TABLET ORAL at 09:01

## 2020-09-25 RX ADMIN — PANTOPRAZOLE SODIUM 40 MG: 40 TABLET, DELAYED RELEASE ORAL at 08:48

## 2020-09-25 RX ADMIN — METOPROLOL TARTRATE 25 MG: 25 TABLET, FILM COATED ORAL at 08:48

## 2020-09-25 RX ADMIN — CEFAZOLIN SODIUM 2 G: 2 INJECTION, SOLUTION INTRAVENOUS at 03:20

## 2020-09-25 RX ADMIN — FLUOXETINE HYDROCHLORIDE 20 MG: 20 CAPSULE ORAL at 08:48

## 2020-09-25 RX ADMIN — ACETAMINOPHEN 1000 MG: 500 TABLET, FILM COATED ORAL at 08:48

## 2020-09-25 RX ADMIN — OXYCODONE 5 MG: 5 TABLET ORAL at 05:22

## 2020-09-25 RX ADMIN — ASPIRIN 325 MG: 325 TABLET, COATED ORAL at 08:48

## 2020-09-25 NOTE — PLAN OF CARE
Problem: Adult Inpatient Plan of Care  Goal: Plan of Care Review  Recent Flowsheet Documentation  Taken 9/25/2020 0950 by Rachel Navas OT  Progress: improving  Plan of Care Reviewed With: patient  Outcome Summary: OT eval complete. Pt completes transfers with SBA and RW. Pt has all dressing equipment at home. Pt completed LBD and UBD independently. OT issued LH sponge and leg  and educated pt on use. Recommend d/c home with family and HH.

## 2020-09-25 NOTE — PROGRESS NOTES
Discharge Planning Assessment  [facility]     Patient Name: Ирина Gutierrez  MRN: [id number]  Today's Date: 9/25/2020    Admit Date: 9/24/2020    Discharge Needs Assessment     Row Name 09/25/20 1141       Living Environment    Lives With  spouse    Name(s) of Who Lives With Patient  Stefano Gutierrez    Current Living Arrangements  home/apartment/condo    Family Caregiver if Needed  spouse    Quality of Family Relationships  helpful;involved;supportive    Able to Return to Prior Arrangements  yes       Resource/Environmental Concerns    Resource/Environmental Concerns  none    Transportation Concerns  car, none       Transition Planning    Patient/Family Anticipates Transition to  home with family    Patient/Family Anticipated Services at Transition  rehabilitation services    Transportation Anticipated  family or friend will provide       Discharge Needs Assessment    Readmission Within the Last 30 Days  no previous admission in last 30 days    Equipment Currently Used at Home  walker, rolling;commode;cane, straight Built in shower seat    Equipment Needed After Discharge  none    Outpatient/Agency/Support Group Needs  outpatient therapy    Discharge Facility/Level of Care Needs  outpatient therapy    Provided Post Acute Provider List?  Yes    Post Acute Provider List  Outpatient Therapy    Provided Post Acute Provider Quality & Resource List?  Yes    Delivered To  Patient;Support Person    Method of Delivery  In person    Patient's Choice of Community Agency(s)  [facility]        Discharge Plan     Row Name 09/25/20 1143       Plan    Plan  Home with 's assistance and [facility]    Patient/Family in Agreement with Plan  yes    Plan Comments  Met with Ms. Gutierrez and her , Stefano, at the bedside for discharge planning.  Ms. Gutierrez lives with her  in [place] [place] [facility] [place] [facility] [place].  She is ambulating with a rolling walker.  She denies any further DME needs in the home.     Discussed physical therapy and Ms. Gutierrez states that she is going to [facility] and already has appointment scheduled for Monday.  No other needs noted.  Ms. Gutierrez states that her  can assist her at home as needed and will be transporting her home at discharge.    CM will continue to follow.    Final Discharge Disposition Code  01 - home or self-care        Continued Care and Services - Admitted Since 9/24/2020    Coordination has not been started for this encounter.       Expected Discharge Date and Time     Expected Discharge Date Expected Discharge Time    Sep 25, 2020         Demographic Summary     Row Name 09/25/20 1137       General Information    Admission Type  observation    Arrived From  home    Reason for Consult  discharge planning    General Information Comments  PCP:  Preston Grant       Contact Information    Permission Granted to Share Info With      Contact Information Comments  :  Stefano, ph [phone]        Functional Status     Row Name 09/25/20 1140       Functional Status    Usual Activity Tolerance  moderate    Current Activity Tolerance  -- See PT notes.       Functional Status, IADL    Medications  independent    Meal Preparation  independent    Housekeeping  independent    Laundry  independent    Shopping  independent       Mental Status    General Appearance WDL  WDL        Psychosocial    No documentation.       Abuse/Neglect    No documentation.       Legal    No documentation.       Substance Abuse    No documentation.       Patient Forms    No documentation.           Madelaine Sequeira RN

## 2020-09-25 NOTE — ANESTHESIA POSTPROCEDURE EVALUATION
Patient: Ирина Gutierrez    Procedure Summary     Date: 09/24/20 [facility]    Anesthesia Start: 1127 Anesthesia Stop: 1309    Procedure: TOTAL KNEE ARTHROPLASTY RIGHT (Right Knee) Diagnosis:       Primary localized osteoarthritis of right knee      (right knee osteoarthritis)    Surgeon: Grant Diez MD Provider: Sophy Yeung MD    Anesthesia Type: spinal, MAC, regional ASA Status: 3          Anesthesia Type: spinal, MAC, regional    Vitals  Vitals Value Taken Time   /69 09/24/20 1500   Temp 97.4 °F (36.3 °C) 09/24/20 1500   Pulse 56 09/24/20 1508   Resp 16 09/24/20 1500   SpO2 89 % 09/24/20 1509   Vitals shown include unvalidated device data.        Post Anesthesia Care and Evaluation    Patient location during evaluation: PACU  Patient participation: complete - patient participated  Level of consciousness: awake and alert  Pain score: 0  Pain management: adequate  Airway patency: patent  Anesthetic complications: No anesthetic complications  PONV Status: none  Cardiovascular status: hemodynamically stable and acceptable  Respiratory status: nonlabored ventilation, acceptable and nasal cannula  Hydration status: acceptable

## 2020-09-25 NOTE — DISCHARGE SUMMARY
Patient Name: Ирина Gutierrez  MRN: [id number]  : 1955  DOS: 2020    Attending: No att. providers found    Primary Care Provider: Preston Grant MD    Date of Admission:.2020  9:43 AM    Date of Discharge:  2020    Discharge Diagnosis:   Status post total right knee replacement    Hyperlipidemia    HTN (hypertension)    Primary localized osteoarthritis of right knee    Acute postoperative pain    Acute blood loss anemia, mild, asymptomatic      Hospital Course    At admit:    Patient is a pleasant 65 y.o. female presented for scheduled surgery by Dr. Diez.  She underwent right total knee arthroplasty under spinal anesthesia.  She tolerated surgery well and was admitted for further medical management.  Her knee has been painful for many years.  She denies use of assistive device for ambulation or recent falls.     She is known to us from previous admissions most recently 10/17/2019 for left knee replacement; which she recovered well.     After admit:    Patient was provided pain medications as needed for pain control, along with adductor canal nerve block infusion of Ropivacaine.    Adjustments were made to pain medications to optimize postop pain management. Risks and benefits of opiate medications discussed with patient.    SHe was seen by PT and OT and has progressed well over her stay.    She used an IS for atelectasis prophylaxis and aspirin along with mechanicals for DVT prophylaxis.  Home medications were resumed as appropriate, and labs were monitored and remained fairly stable.     With the progress she has made, she is ready for DC home today.      She will have an Arrow pump (instructed on it during this admit).    Discussed with patient regarding plan and she shows understanding and agreement.    Patient will have HHPT following discharge.      Procedures Performed    Preoperative diagnosis:Right knee end stage osteoarthritis     Postoperative diagnosis: Right knee end  "stage osteoarthritis     Operative procedure: Right total knee arthroplasty     Surgeon: Dr. Sekou Diez    Pertinent Test Results:    I reviewed the patient's new clinical results.   Results from last 7 days   Lab Units 20  0743   WBC 10*3/mm3 7.07   HEMOGLOBIN g/dL 10.4*   HEMATOCRIT % 33.8*   PLATELETS 10*3/mm3 187     Results for KAROL BURGESS (MRN [id number]) as of 2020 15:39   Ref. Range 9/10/2020 12:16   Hemoglobin Latest Ref Range: 12.0 - 15.9 g/dL 12.6   Hematocrit Latest Ref Range: 34.0 - 46.6 % 39.1     Results from last 7 days   Lab Units 20  0743   SODIUM mmol/L 138   POTASSIUM mmol/L 3.8   CHLORIDE mmol/L 104   CO2 mmol/L 27.0   BUN mg/dL 14   CREATININE mg/dL 0.74   CALCIUM mg/dL 8.4*   GLUCOSE mg/dL 94     I reviewed the patient's new imaging including images and reports.      Physical therapy: Pt with improved gait tolerance to 200', RW, SBA.  Cues for upright posture and increased WB on RLE.  Pt also demonstrates safety with BW approach 1 step.  Pt safe to discharge home with assist and HH      Discharge Assessment:    Vital Signs  Visit Vitals  /58 (BP Location: Left arm, Patient Position: Lying)   Pulse 71   Temp 97.8 °F (36.6 °C) (Oral)   Resp 16   Ht 162.6 cm (64\")   Wt 82.1 kg (181 lb)   SpO2 97%   BMI 31.07 kg/m²     Temp (24hrs), Av °F (36.7 °C), Min:97.8 °F (36.6 °C), Max:98.1 °F (36.7 °C)      General Appearance:    Alert, cooperative, in no acute distress   Lungs:     Clear to auscultation,respirations regular, even and unlabored    Heart:    Regular rhythm and normal rate, normal S1 and S2   Abdomen:     Normal bowel sounds, no masses, no organomegaly, soft non-tender, non-distended, no guarding, no rebound tenderness   Extremities:   CDI dressing surgical knee, right. ACB cath present, arrow pump. Moves all extremities well, no edema, no cyanosis, no redness   Pulses:   Pulses palpable and equal bilaterally   Skin:   No bleeding, bruising or rash "   Neurologic:   Cranial nerves 2 - 12 grossly intact, sensation intact, Flexion and dorsiflexion intact bilateral feet.       Discharge Disposition: Home    Discharge Medications:     Discharge Medications      New Medications      Instructions Start Date   docusate sodium 100 MG capsule  Commonly known as: Colace   100 mg, Oral, 2 Times Daily      oxyCODONE 5 MG immediate release tablet  Commonly known as: ROXICODONE   5 mg, Oral, Every 4 Hours PRN      Ropivacine HCl-NaCl  Commonly known as: NAROPIN  Notes to patient: CONTINUOUS INFUSION   10 mL/hr (20 mg/hr), Peripheral Nerve, Continuous         Changes to Medications      Instructions Start Date   aspirin 81 MG EC tablet  What changed: Another medication with the same name was added. Make sure you understand how and when to take each.  Notes to patient: RESUME IN ONE MONTH   81 mg, Oral, Daily, Resume in 1 month      aspirin  MG tablet  What changed: You were already taking a medication with the same name, and this prescription was added. Make sure you understand how and when to take each.   325 mg, Oral, Daily   Start Date: September 26, 2020        Continue These Medications      Instructions Start Date   Dexilant 60 MG capsule  Generic drug: dexlansoprazole   60 mg, Oral, Daily      FLUoxetine 20 MG capsule  Commonly known as: PROzac   20 mg, Oral, Daily      levocetirizine 5 MG tablet  Commonly known as: XYZAL   5 mg, Oral, Every Evening      metoprolol tartrate 25 MG tablet  Commonly known as: LOPRESSOR   25 mg, Oral, 2 Times Daily      MIRALAX PO   1 dose, Oral, Daily      NON FORMULARY   1 each, Daily, CBD gel cap, 25mg daily       pentosan polysulfate 100 MG capsule  Commonly known as: ELMIRON   100 mg, Oral, 2 Times Daily      pravastatin 20 MG tablet  Commonly known as: PRAVACHOL   20 mg, Oral, Daily      temazepam 30 MG capsule  Commonly known as: RESTORIL   30 mg, Oral, Nightly PRN             Discharge Diet: Regular diet  Diet Instructions      You may resume a regular diet.                Activity at Discharge: WBAT RLE  Activity Instructions     You may bear weight as tolerated on your right leg.     Use ice as needed for pain and swelling.                Follow-up Appointments:  Dr. Diez per his orders      RAJ Ramos  09/25/20  15:39 EDT

## 2020-09-25 NOTE — THERAPY TREATMENT NOTE
Patient Name: Ирина Gutierrez  : 1955    MRN: [id number]                              Today's Date: 2020       Admit Date: 2020    Visit Dx:     ICD-10-CM ICD-9-CM   1. Status post total right knee replacement  Z96.651 V43.65     Patient Active Problem List   Diagnosis   • Herniated disc, cervical, s/p ACDF   • Hyperlipidemia   • Primary osteoarthritis of left knee   • Status post total right knee replacement   • HTN (hypertension)   • Primary localized osteoarthritis of right knee     Past Medical History:   Diagnosis Date   • Arthritis    • Clostridium difficile infection 2018   • Depression    • GERD (gastroesophageal reflux disease)    • High cholesterol    • Hypertension    • Interstitial cystitis    • LBBB (left bundle branch block)    • Neck pain    • OAB (overactive bladder)    • Osteoporosis    • Squamous cell carcinoma in situ     REMOVED FROM BOTH SHOULDERS, LUE, RLE, AND NOSE    • Tachycardia     Metoprolol to regulate HR   • TIA (transient ischemic attack) 2011    NO RESIDUAL PROBLEMS    • Wears reading eyeglasses      Past Surgical History:   Procedure Laterality Date   • ANTERIOR CERVICAL DISCECTOMY W/ FUSION N/A 2017    Procedure: CERVICAL DISCECTOMY ANTERIOR WITH FUSION C6-7 CAGE PLATE ALLOGRAFT;  Surgeon: Sanket Murrell MD;  Location:  [facility];  Service:    • BLADDER SUSPENSION     • CATARACT EXTRACTION     • COLONOSCOPY     • ENDOSCOPY     • FINGER SURGERY Right     THIRD AND FOURTH FINGER RIGHT HAND    • HYSTERECTOMY     • LASIK     • TOTAL KNEE ARTHROPLASTY Left 10/17/2019    Procedure: TOTAL KNEE REPLACEMENT LEFT;  Surgeon: Grant Diez MD;  Location:  [facility];  Service: Orthopedics   • TOTAL KNEE ARTHROPLASTY Right 2020    Procedure: TOTAL KNEE ARTHROPLASTY RIGHT;  Surgeon: Grant Diez MD;  Location:  [facility];  Service: Orthopedics;  Laterality: Right;     General Information     Row Name 20 1123           Physical Therapy Time and Intention    Document Type  therapy note (daily note)  -KG     Mode of Treatment  physical therapy  -KG     Row Name 09/25/20 1123          General Information    Patient Profile Reviewed  yes  -KG     Existing Precautions/Restrictions  fall;other (see comments) R adductor nerve catheter  -KG     Row Name 09/25/20 1123          Cognition    Orientation Status (Cognition)  oriented x 4  -KG     Row Name 09/25/20 1123          Safety Issues, Functional Mobility    Impairments Affecting Function (Mobility)  balance;pain;range of motion (ROM);strength  -KG       User Key  (r) = Recorded By, (t) = Taken By, (c) = Cosigned By    Initials Name Provider Type    KG Alexsandra Morales Physical Therapist        Mobility     Row Name 09/25/20 1124          Bed Mobility    Comment (Bed Mobility)  UIC  -KG     Row Name 09/25/20 1124          Transfers    Comment (Transfers)  cues for safe hand placement, sequencing, sliding RLE out for comfort  -KG     Row Name 09/25/20 1124          Sit-Stand Transfer    Sit-Stand Schurz (Transfers)  standby assist  -KG     Assistive Device (Sit-Stand Transfers)  walker, front-wheeled  -KG     Row Name 09/25/20 1124          Gait/Stairs (Locomotion)    Schurz Level (Gait)  standby assist  -KG     Assistive Device (Gait)  walker, front-wheeled  -KG     Distance in Feet (Gait)  200  -KG     Deviations/Abnormal Patterns (Gait)  bilateral deviations;patrick decreased;gait speed decreased;antalgic;stride length decreased  -KG     Right Sided Gait Deviations  heel strike decreased;weight shift ability decreased  -KG     Comment (Gait/Stairs)  Pt with improved gait tolerance to 200', RW, SBA.  Cues for upright posture and increased WB on RLE.  -KG     Row Name 09/25/20 1124          Mobility    Extremity Weight-bearing Status  right lower extremity  -KG     Right Lower Extremity (Weight-bearing Status)  weight-bearing as tolerated (WBAT)  -KG       User Key  (r) =  Recorded By, (t) = Taken By, (c) = Cosigned By    Initials Name Provider Type    LORENZA Alexsandra Morales Physical Therapist        Obj/Interventions     Row Name 09/25/20 1125          Knee (Therapeutic Exercise)    Knee (Therapeutic Exercise)  AROM (active range of motion)  -KG     Knee AROM (Therapeutic Exercise)  right;SLR (straight leg raise);LAQ (long arc quad);heel slides;10 repetitions  -KG     Knee Isometrics (Therapeutic Exercise)  right;quad sets;10 repetitions  -KG     Row Name 09/25/20 1125          Ankle (Therapeutic Exercise)    Ankle (Therapeutic Exercise)  AROM (active range of motion)  -KG     Ankle AROM (Therapeutic Exercise)  dorsiflexion;plantarflexion;10 repetitions  -KG     Row Name 09/25/20 1125          Balance    Dynamic Standing Balance  mild impairment  -KG       User Key  (r) = Recorded By, (t) = Taken By, (c) = Cosigned By    Initials Name Provider Type    LORENZA Alexsandra Morales Physical Therapist        Goals/Plan    No documentation.       Clinical Impression     Row Name 09/25/20 1126          Pain    Additional Documentation  Pain Scale: Numbers Pre/Post-Treatment (Group)  -KG     Row Name 09/25/20 1126          Pain Scale: Numbers Pre/Post-Treatment    Pretreatment Pain Rating  5/10  -KG     Posttreatment Pain Rating  5/10  -KG     Pain Location - Side  Right  -KG     Pain Location  knee  -KG     Pain Intervention(s)  Ambulation/increased activity;Repositioned  -KG     Row Name 09/25/20 1126          Plan of Care Review    Plan of Care Reviewed With  patient  -KG     Progress  improving  -KG     Outcome Summary  Pt with improved gait tolerance to 200', RW, SBA.  Cues for upright posture and increased WB on RLE.  Pt also demonstrates safety with BW approach 1 step.  Pt safe to discharge home with assist and HH  -KG     Row Name 09/25/20 1126          Positioning and Restraints    Pre-Treatment Position  sitting in chair/recliner  -KG     Post Treatment Position  chair  -KG     In Chair   notified nsg;call light within reach;encouraged to call for assist;exit alarm on  -KG       User Key  (r) = Recorded By, (t) = Taken By, (c) = Cosigned By    Initials Name Provider Type    Alexsandra James Physical Therapist        Outcome Measures     Row Name 09/25/20 1130          How much help from another person do you currently need...    Turning from your back to your side while in flat bed without using bedrails?  4  -KG     Moving from lying on back to sitting on the side of a flat bed without bedrails?  4  -KG     Moving to and from a bed to a chair (including a wheelchair)?  3  -KG     Standing up from a chair using your arms (e.g., wheelchair, bedside chair)?  3  -KG     Climbing 3-5 steps with a railing?  3  -KG     To walk in hospital room?  3  -KG     AM-PAC 6 Clicks Score (PT)  20  -KG     Row Name 09/25/20 1130          Functional Assessment    Outcome Measure Options  AM-PAC 6 Clicks Basic Mobility (PT)  -KG       User Key  (r) = Recorded By, (t) = Taken By, (c) = Cosigned By    Initials Name Provider Type    Alexsandra James Physical Therapist        Physical Therapy Education                 Title: PT OT SLP Therapies (In Progress)     Topic: Physical Therapy (Done)     Point: Mobility training (Done)     Learning Progress Summary           Patient Acceptance, E,TB, VU by LORENZA at 9/25/2020 1130    Acceptance, E,D, VU by CHANDANA at 9/24/2020 1545    Comment: Educated on safe sequencing with bed mobility, ambulatory transfers, and gait. Reviewed HEP and knee precautions.   Significant Other Acceptance, E,D, VU by CHANDANA at 9/24/2020 1545    Comment: Educated on safe sequencing with bed mobility, ambulatory transfers, and gait. Reviewed HEP and knee precautions.                   Point: Home exercise program (Done)     Learning Progress Summary           Patient Acceptance, E,TB, VU by LORENZA at 9/25/2020 1130    Acceptance, E,D, VU by CHANDANA at 9/24/2020 1545    Comment: Educated on safe sequencing with bed  mobility, ambulatory transfers, and gait. Reviewed HEP and knee precautions.   Significant Other Acceptance, E,D, VU by CHANDANA at 9/24/2020 1545    Comment: Educated on safe sequencing with bed mobility, ambulatory transfers, and gait. Reviewed HEP and knee precautions.                   Point: Body mechanics (Done)     Learning Progress Summary           Patient Acceptance, E,TB, VU by KG at 9/25/2020 1130    Acceptance, E,D, VU by CHANDANA at 9/24/2020 1545    Comment: Educated on safe sequencing with bed mobility, ambulatory transfers, and gait. Reviewed HEP and knee precautions.   Significant Other Acceptance, E,D, VU by CHANDANA at 9/24/2020 1545    Comment: Educated on safe sequencing with bed mobility, ambulatory transfers, and gait. Reviewed HEP and knee precautions.                   Point: Precautions (Done)     Learning Progress Summary           Patient Acceptance, E,TB, VU by KG at 9/25/2020 1130    Acceptance, E,D, VU by CHANDANA at 9/24/2020 1545    Comment: Educated on safe sequencing with bed mobility, ambulatory transfers, and gait. Reviewed HEP and knee precautions.   Significant Other Acceptance, E,D, VU by CHANDANA at 9/24/2020 1545    Comment: Educated on safe sequencing with bed mobility, ambulatory transfers, and gait. Reviewed HEP and knee precautions.                               User Key     Initials Effective Dates Name Provider Type Discipline     08/28/19 -  Alexsandra Morales Physical Therapist PT    CHANDANA 09/10/19 -  Abelino Chicas PT Physical Therapist PT              PT Recommendation and Plan     Plan of Care Reviewed With: patient  Progress: improving  Outcome Summary: Pt with improved gait tolerance to 200', RW, SBA.  Cues for upright posture and increased WB on RLE.  Pt also demonstrates safety with BW approach 1 step.  Pt safe to discharge home with assist and HH     Time Calculation:   PT Charges     Row Name 09/25/20 1136             Time Calculation    Start Time  0940  -KG      PT Received On  09/25/20   -KG      PT Goal Re-Cert Due Date  10/04/20  -KG         Time Calculation- PT    Total Timed Code Minutes- PT  38 minute(s)  -KG         Timed Charges    75972 - PT Therapeutic Exercise Minutes  15  -KG      90960 - Gait Training Minutes   23  -KG        User Key  (r) = Recorded By, (t) = Taken By, (c) = Cosigned By    Initials Name Provider Type    Alexsandra James Physical Therapist        Therapy Charges for Today     Code Description Service Date Service Provider Modifiers Qty    32464883264 HC PT THER PROC EA 15 MIN 9/25/2020 Alexsandra Morales GP 1    69883819587 HC GAIT TRAINING EA 15 MIN 9/25/2020 Alexsandra Morales GP 2          PT G-Codes  Outcome Measure Options: AM-PAC 6 Clicks Basic Mobility (PT)  AM-PAC 6 Clicks Score (PT): 20  AM-PAC 6 Clicks Score (OT): 21    Alexsandra Morales  9/25/2020

## 2020-09-25 NOTE — DISCHARGE INSTRUCTIONS
You have a prineo dressing in place. This dressing is waterproof and you may shower with it in place. It will remains in place for 2 weeks and does not need to be covered by another dressing.     You have a nerve catheter in place to assist with pain control. Please call the phone number provided by anesthesiology with any questions, problems, or concerns regarding your nerve catheter.     [facility] will provide you with physical therapy. Your first appointment is Monday September 28, 2020 at 1:00 pm

## 2020-09-25 NOTE — THERAPY DISCHARGE NOTE
Acute Care - Occupational Therapy Discharge   [facility]    Patient Name: Ирина Gutierrez  : 1955    MRN: [id number]                              Today's Date: 2020       Admit Date: 2020    Visit Dx:     ICD-10-CM ICD-9-CM   1. Status post total right knee replacement  Z96.651 V43.65     Patient Active Problem List   Diagnosis   • Herniated disc, cervical, s/p ACDF   • Hyperlipidemia   • Primary osteoarthritis of left knee   • Status post total right knee replacement   • HTN (hypertension)   • Primary localized osteoarthritis of right knee     Past Medical History:   Diagnosis Date   • Arthritis    • Clostridium difficile infection 2018   • Depression    • GERD (gastroesophageal reflux disease)    • High cholesterol    • Hypertension    • Interstitial cystitis    • LBBB (left bundle branch block)    • Neck pain    • OAB (overactive bladder)    • Osteoporosis    • Squamous cell carcinoma in situ     REMOVED FROM BOTH SHOULDERS, LUE, RLE, AND NOSE    • Tachycardia     Metoprolol to regulate HR   • TIA (transient ischemic attack) 2011    NO RESIDUAL PROBLEMS    • Wears reading eyeglasses      Past Surgical History:   Procedure Laterality Date   • ANTERIOR CERVICAL DISCECTOMY W/ FUSION N/A 2017    Procedure: CERVICAL DISCECTOMY ANTERIOR WITH FUSION C6-7 CAGE PLATE ALLOGRAFT;  Surgeon: Sanket Murrell MD;  Location:  [facility];  Service:    • BLADDER SUSPENSION     • CATARACT EXTRACTION     • COLONOSCOPY     • ENDOSCOPY     • FINGER SURGERY Right     THIRD AND FOURTH FINGER RIGHT HAND    • HYSTERECTOMY     • LASIK     • TOTAL KNEE ARTHROPLASTY Left 10/17/2019    Procedure: TOTAL KNEE REPLACEMENT LEFT;  Surgeon: Grant Diez MD;  Location:  [facility];  Service: Orthopedics   • TOTAL KNEE ARTHROPLASTY Right 2020    Procedure: TOTAL KNEE ARTHROPLASTY RIGHT;  Surgeon: Grant Diez MD;  Location:  [facility];  Service: Orthopedics;  Laterality: Right;      General Information     Row Name 09/25/20 0944          OT Time and Intention    Document Type  discharge evaluation/summary  -HK     Mode of Treatment  individual therapy;occupational therapy  -     Row Name 09/25/20 0944          General Information    Patient Profile Reviewed  yes  -HK     Prior Level of Function  min assist:;all household mobility;community mobility;gait;transfer;bed mobility;ADL's  -HK     Existing Precautions/Restrictions  fall;other (see comments) R knee precautions; R adductor canal nerve catheter  -     Barriers to Rehab  none identified  -     Row Name 09/25/20 0944          Living Environment    Lives With  spouse  -     Row Name 09/25/20 0944          Home Main Entrance    Number of Stairs, Main Entrance  one  -HK     Stair Railings, Main Entrance  none  -HK     Row Name 09/25/20 0944          Stairs Within Home, Primary    Stairs Comment, Within Home, Primary  has walk in shower  -     Row Name 09/25/20 0944          Cognition    Orientation Status (Cognition)  oriented x 4  -HK     Row Name 09/25/20 0944          Safety Issues, Functional Mobility    Safety Issues Affecting Function (Mobility)  safety precaution awareness;safety precautions follow-through/compliance  -     Impairments Affecting Function (Mobility)  balance;pain;range of motion (ROM);strength  -       User Key  (r) = Recorded By, (t) = Taken By, (c) = Cosigned By    Initials Name Provider Type    HK Rachel Navas OT Occupational Therapist        Mobility/ADL's     Row Name 09/25/20 0945          Bed Mobility    Comment (Bed Mobility)  Pt received and left [facility]. OT issued leg  and educated pt on use for completion of bed mobility and car transfers.  -     Row Name 09/25/20 0945          Transfers    Transfers  sit-stand transfer  -     Comment (Transfers)  Cues for safe hand placement, seqeuncing, and to slide R LE out prior to transfers.  -HK     Sit-Stand [place] (Transfers)  standby  assist  -[facility] Name 09/25/20 0945          Sit-Stand Transfer    Assistive Device (Sit-Stand Transfers)  walker, front-wheeled  -     Row Name 09/25/20 0945          Functional Mobility    Functional Mobility- Comment  not tested  -[facility] Name 09/25/20 0945          Activities of Daily Living    BADL Assessment/Intervention  lower body dressing;upper body dressing;bathing  -[facility] Name 09/25/20 0945          Mobility    Extremity Weight-bearing Status  right lower extremity  -HK     Right Lower Extremity (Weight-bearing Status)  weight-bearing as tolerated (WBAT)  -[facility] Name 09/25/20 0945          Lower Body Dressing Assessment/Training    [place] Level (Lower Body Dressing)  doff;don;pants/bottoms;socks;independent  -HK     Position (Lower Body Dressing)  unsupported sitting  -HK     Comment (Lower Body Dressing)  Pt has reacher, sock aid, and shoe horn at home. Pt donned pants, underwear, and socks independently with no assist from OT and no AE.  -[facility] Name 09/25/20 0945          Upper Body Dressing Assessment/Training    [place] Level (Upper Body Dressing)  don;pull-over garment;independent  -HK     Position (Upper Body Dressing)  unsupported sitting  -[facility] Name 09/25/20 0945          Bathing Assessment/Intervention    Comment (Bathing)  OT issued LH sponge and educated pt on use for completion of all LBB.  -       User Key  (r) = Recorded By, (t) = Taken By, (c) = Cosigned By    Initials Name Provider Type     Rachel Navas OT Occupational Therapist        Obj/Interventions     [facility] Name 09/25/20 0948          Sensory Assessment (Somatosensory)    Sensory Assessment (Somatosensory)  sensation intact  -HK     Row Name 09/25/20 0948          Vision Assessment/Intervention    Visual Impairment/Limitations  corrective lenses for reading  -[facility] Name 09/25/20 0948          Range of Motion Comprehensive    General Range of Motion  no range of motion deficits  identified  -HK     Comment, General Range of Motion  B UE WFL for eval  -HK     Row Name 09/25/20 0948          Strength Comprehensive (MMT)    General Manual Muscle Testing (MMT) Assessment  no strength deficits identified  -HK     Comment, General Manual Muscle Testing (MMT) Assessment  B UE WFL for eval  -HK     Row Name 09/25/20 0948          Balance    Balance Assessment  sitting static balance;sitting dynamic balance;standing static balance;standing dynamic balance  -HK     Static Sitting Balance  WNL  -HK     Dynamic Sitting Balance  WNL  -HK     Static Standing Balance  WFL  -HK     Dynamic Standing Balance  mild impairment  -HK       User Key  (r) = Recorded By, (t) = Taken By, (c) = Cosigned By    Initials Name Provider Type    Rachel Duarte, OT Occupational Therapist        Goals/Plan     Row Name 09/25/20 0956          Transfer Goal 1 (OT)    Activity/Assistive Device (Transfer Goal 1, OT)  sit-to-stand/stand-to-sit  -HK     [place] Level/Cues Needed (Transfer Goal 1, OT)  standby assist  -HK     Time Frame (Transfer Goal 1, OT)  by discharge  -HK     Progress/Outcome (Transfer Goal 1, OT)  goal met  -HK     Row Name 09/25/20 0956          Bathing Goal 1 (OT)    Activity/Device (Bathing Goal 1, OT)  long-handled sponge Pt will be able to demonstrate  -HK     [place] Level/Cues Needed (Bathing Goal 1, OT)  modified independence  -HK     Time Frame (Bathing Goal 1, OT)  by discharge  -HK     Progress/Outcomes (Bathing Goal 1, OT)  goal met  -HK     Row Name 09/25/20 0956          Dressing Goal 1 (OT)    Activity/Device (Dressing Goal 1, OT)  lower body dressing  -HK     [place]/Cues Needed (Dressing Goal 1, OT)  independent  -HK     Time Frame (Dressing Goal 1, OT)  by discharge  -HK     Progress/Outcome (Dressing Goal 1, OT)  goal met  -HK       User Key  (r) = Recorded By, (t) = Taken By, (c) = Cosigned By    Initials Name Provider Type    Rachel Duarte, OT Occupational Therapist         Clinical Impression     Row Name 09/25/20 0950          Pain Assessment    Additional Documentation  Pain Scale: Numbers Pre/Post-Treatment (Group)  -HK     Row Name 09/25/20 0977          Pain Scale: Numbers Pre/Post-Treatment    Pretreatment Pain Rating  5/10  -HK     Posttreatment Pain Rating  5/10  -HK     Pain Location - Side  Right  -HK     Pain Location - Orientation  generalized  -HK     Pain Location  knee  -HK     Pain Intervention(s)  Ambulation/increased activity;Repositioned  -HK     Row Name 09/25/20 0922          Plan of Care Review    Plan of Care Reviewed With  patient  -HK     Progress  improving  -HK     Outcome Summary  OT eval complete. Pt completes transfers with SBA and RW. Pt has all dressing equipment at home. Pt completed LBD and UBD independently. OT issued LH sponge and leg  and educated pt on use. Recommend d/c home with family and HH.  -     Row Name 09/25/20 0948          Therapy Assessment/Plan (OT)    Patient/Family Therapy Goal Statement (OT)  Pt would like to improve and return home.  -HK     Rehab Potential (OT)  good, to achieve stated therapy goals  -     Criteria for Skilled Therapeutic Interventions Met (OT)  yes;skilled treatment is necessary  -HK     Therapy Frequency (OT)  daily  -     Row Name 09/25/20 0993          Therapy Plan Review/Discharge Plan (OT)    Equipment Needs Upon Discharge (OT)  raised toilet seat  -HK     Anticipated Discharge Disposition (OT)  home with assist;home with home health  -     Row Name 09/25/20 0973          Vital Signs    Pre Systolic BP Rehab  -- RN cleared for tx; VSS  -HK     O2 Delivery Pre Treatment  room air  -HK     O2 Delivery Intra Treatment  room air  -HK     O2 Delivery Post Treatment  room air  -HK     Pre Patient Position  Sitting  -HK     Intra Patient Position  Standing  -HK     Post Patient Position  Sitting  -HK     Row Name 09/25/20 0924          Positioning and Restraints    Pre-Treatment Position  sitting  in chair/recliner  -HK     Post Treatment Position  chair  -HK     In Chair  notified nsg;reclined;call light within reach;encouraged to call for assist  -HK       User Key  (r) = Recorded By, (t) = Taken By, (c) = Cosigned By    Initials Name Provider Type    Rachel Duarte OT Occupational Therapist        Outcome Measures     Row Name 09/25/20 0957          How much help from another is currently needed...    Putting on and taking off regular lower body clothing?  4  -HK     Bathing (including washing, rinsing, and drying)  3  -HK     Toileting (which includes using toilet bed pan or urinal)  3  -HK     Putting on and taking off regular upper body clothing  4  -HK     Taking care of personal grooming (such as brushing teeth)  3  -HK     Eating meals  4  -HK     AM-PAC 6 Clicks Score (OT)  21  -HK     Row Name 09/25/20 0957          Functional Assessment    Outcome Measure Options  AM-PAC 6 Clicks Daily Activity (OT)  -HK       User Key  (r) = Recorded By, (t) = Taken By, (c) = Cosigned By    Initials Name Provider Type    Rachel Duarte OT Occupational Therapist        Occupational Therapy Education                 Title: PT OT SLP Therapies (In Progress)     Topic: Occupational Therapy (In Progress)     Point: ADL training (Done)     Description:   Instruct learner(s) on proper safety adaptation and remediation techniques during self care or transfers.   Instruct in proper use of assistive devices.              Learning Progress Summary           Patient Acceptance, E,TB,D, VU by  at 9/25/2020 0958                   Point: Home exercise program (Not Started)     Description:   Instruct learner(s) on appropriate technique for monitoring, assisting and/or progressing therapeutic exercises/activities.              Learner Progress:  Not documented in this visit.          Point: Precautions (Done)     Description:   Instruct learner(s) on prescribed precautions during self-care and functional transfers.               Learning Progress Summary           Patient Acceptance, E,TB,D, VU by  at 9/25/2020 0958                   Point: Body mechanics (Done)     Description:   Instruct learner(s) on proper positioning and spine alignment during self-care, functional mobility activities and/or exercises.              Learning Progress Summary           Patient Acceptance, E,TB,D, VU by  at 9/25/2020 0958                               User Key     Initials Effective Dates Name Provider Type [facility]1983 03/07/18 -  Rachel Navas OT Occupational Therapist OT              OT Recommendation and Plan  Retired Outcome Summary/Treatment Plan (OT)  Anticipated Discharge Disposition (OT): home with assist, home with home health  Therapy Frequency (OT): daily  Plan of Care Review  Plan of Care Reviewed With: patient  Progress: improving  Outcome Summary: OT eval complete. Pt completes transfers with SBA and RW. Pt has all dressing equipment at home. Pt completed LBD and UBD independently. OT issued LH sponge and leg  and educated pt on use. Recommend d/c home with family and HH.  Plan of Care Reviewed With: patient  Outcome Summary: OT eval complete. Pt completes transfers with SBA and RW. Pt has all dressing equipment at home. Pt completed LBD and UBD independently. OT issued LH sponge and leg  and educated pt on use. Recommend d/c home with family and HH.     Time Calculation:   Time Calculation- OT     Row Name 09/25/20 0851             Time Calculation- OT    OT Start Time  0851  -      OT Received On  09/25/20  -      OT Goal Re-Cert Due Date  10/05/20  -         Timed Charges    28069 - OT Self Care/Mgmt Minutes  24  -        User Key  (r) = Recorded By, (t) = Taken By, (c) = Cosigned By    Initials Name Provider Type     Rachel Navas, OT Occupational Therapist        Therapy Charges for Today     Code Description Service Date Service Provider Modifiers Qty    29132333673  OT SELF CARE/MGMT/TRAIN EA  15 MIN 9/25/2020 Rachel Navas, OT GO 2    53631601141 HC OT EVAL LOW COMPLEXITY 2 9/25/2020 Rachel Navas, OT GO 1    61817458366 HC OT THER SUPP EA 15 MIN 9/25/2020 Rachel Navas, OT GO 2               Rachel Navas OT  9/25/2020

## 2020-09-25 NOTE — PLAN OF CARE
Problem: Adult Inpatient Plan of Care  Goal: Plan of Care Review  Outcome: Adequate for Care Transition  Goal: Patient-Specific Goal (Individualized)  Outcome: Adequate for Care Transition  Goal: Optimal Comfort and Wellbeing  Outcome: Adequate for Care Transition  Intervention: Provide Person-Centered Care  Recent Flowsheet Documentation  Taken 9/25/2020 0800 by Linda Dunbar RN  Trust Relationship/Rapport:   care explained   choices provided   emotional support provided   empathic listening provided   questions answered   questions encouraged   reassurance provided   thoughts/feelings acknowledged  Goal: Readiness for Transition of Care  Outcome: Adequate for Care Transition     Problem: Pain Chronic (Persistent) (Comorbidity Management)  Goal: Acceptable Pain Control and Functional Ability  Outcome: Adequate for Care Transition  Intervention: Manage Persistent Pain  Recent Flowsheet Documentation  Taken 9/25/2020 0800 by Linda Dunbar RN  Sleep/Rest Enhancement:   awakenings minimized   regular sleep/rest pattern promoted  Intervention: Optimize Psychosocial Wellbeing  Recent Flowsheet Documentation  Taken 9/25/2020 0800 by Linda Dunbar RN  Supportive Measures:   active listening utilized   verbalization of feelings encouraged   self-care encouraged  Diversional Activities:   television   smartphone  Family/Support System Care:   self-care encouraged   support provided     Problem: Joint Function Impaired (Knee Arthroplasty)  Goal: Optimal Functional Ability  Outcome: Adequate for Care Transition  Intervention: Promote Optimal Functional Status  Recent Flowsheet Documentation  Taken 9/25/2020 1000 by Linda Dunbar RN  Self-Care Promotion:   BADL personal objects within reach   independence encouraged  Taken 9/25/2020 0800 by Linda Dunbar RN  Self-Care Promotion:   BADL personal objects within reach   independence encouraged     Problem: Ongoing Anesthesia Effects (Knee Arthroplasty)  Goal:  Anesthesia/Sedation Recovery  Outcome: Adequate for Care Transition  Intervention: Optimize Anesthesia Recovery  Recent Flowsheet Documentation  Taken 9/25/2020 1000 by Linda Dunbar RN  Safety Promotion/Fall Prevention:   activity supervised   assistive device/personal items within reach   clutter free environment maintained   fall prevention program maintained   nonskid shoes/slippers when out of bed   room organization consistent   safety round/check completed  Taken 9/25/2020 0800 by Linda Dunbar, RN  Safety Promotion/Fall Prevention:   activity supervised   assistive device/personal items within reach   clutter free environment maintained   fall prevention program maintained   nonskid shoes/slippers when out of bed   room organization consistent   safety round/check completed  Administration (IS): self-administered     Problem: Pain (Knee Arthroplasty)  Goal: Acceptable Pain Control  Outcome: Adequate for Care Transition  Intervention: Prevent or Manage Pain  Recent Flowsheet Documentation  Taken 9/25/2020 0800 by Linda Dunbar, RN  Diversional Activities:   television   smartphone     Problem: Postoperative Urinary Retention (Knee Arthroplasty)  Goal: Effective Urinary Elimination  Outcome: Adequate for Care Transition   Goal Outcome Evaluation:  Plan of Care Reviewed With: patient  Progress: improving

## 2020-09-25 NOTE — PROGRESS NOTES
"/49 (BP Location: Left arm, Patient Position: Lying)   Pulse 64   Temp 98.1 °F (36.7 °C) (Oral)   Resp 16   Ht 162.6 cm (64\")   Wt 82.1 kg (181 lb)   SpO2 91%   BMI 31.07 kg/m²     Lab Results (last 24 hours)     ** No results found for the last 24 hours. **          Imaging Results (Last 24 Hours)     Procedure Component Value Units Date/Time    XR Knee 1 or 2 View Right [[id number]] Collected: 09/24/20 1344     Updated: 09/24/20 1348    Narrative:      EXAMINATION: XR KNEE 1 OR 2 VW RIGHT-      INDICATION: Post-Op Knee Arthoplasty      COMPARISON: NONE     FINDINGS: Right total knee arthroplasty without evidence of immediate  hardware complication. Expected edema and subcutaneous emphysema.       Impression:      Right total knee arthroplasty without evidence of immediate  hardware complication. Expected edema and subcutaneous emphysema.     This report was finalized on 9/24/2020 1:45 PM by Jarocho Arriaza.             Patient Care Team:  Preston Grant MD as PCP - General (Family Medicine)  Elijah Garcia MD as Consulting Physician (Interventional Cardiology)  Raudel Horn MD as Consulting Physician (Urology)    SUBJECTIVE: She is doing well.  Pain is well controlled.  She walked 40 feet with therapy yesterday and walked in the coffey with nursing overnight.  She anticipates going home today.    PHYSICAL EXAM  Right knee incision is clean, dry and intact with mesh dressing in place  Thigh and calf soft and nontender  Neurovascular intact distally       Status post total right knee replacement    Hyperlipidemia    HTN (hypertension)    Primary localized osteoarthritis of right knee      PLAN / DISPOSITION: 65-year-old female postop day 1 status post right total knee arthroplasty  -Continue to mobilize with therapy as tolerated  -Aspirin for DVT prophylaxis  -Okay to shower with mesh dressing in place once pain catheter removed  -She plans to go directly into outpatient physical " therapy at [facility].  She has an appointment on Monday.  -Follow-up in 2 weeks    Grant Diez MD  09/25/20  07:40 EDT

## 2020-09-25 NOTE — PROGRESS NOTES
[facility]    Acute pain service Inpatient Progress Note    Patient Name: Ирина Gutierrez  :  1955  MRN:  [id number]        Acute Pain  Service Inpatient Progress Note:    Analgesia:Good  Pain Score:1/10  LOC: alert and awake  Resp Status: room air  Cardiac: VS stable  Side Effects:None  Catheter Site:clean, dressing intact and dry  Cath type: peripheral nerve cath with ON Q  Infusion rate: 10ml/hr  Catheter Plan:Catheter to remain Insitu and Continue catheter infusion rate unchanged  Comments: Anterior = 1  Posterior = 6  ---------------------------------------------------------------------------------  Physical Therapy Manual Muscle Testing Results:   ]    Physical Therapy - Plan of Care Review - Outcome Summary:  Outcome Summary: PT eval complete. Pt ambulated 40 feet using RW and CGA x2. Gait limited by increased pain. Bed mobility and STS performed with CGA x2. No knee buckling noted with ambulation. Pt IND with SLR. Will assess R knee AROM POD#1. Reviewed HEP and knee precautions. PADD score = 8. Recommend pt d/c home with assist and HHPT when appropriate. (20 [id number])]    Occupational Therapy - Plan of Care Review - Outcome Summary:   ]  ----------------------------------------------------------------------------------

## 2020-09-25 NOTE — PLAN OF CARE
Problem: Adult Inpatient Plan of Care  Goal: Plan of Care Review  Recent Flowsheet Documentation  Taken 9/25/2020 1126 by Alexsandra Morales  Progress: improving  Plan of Care Reviewed With: patient  Outcome Summary: Pt with improved gait tolerance to 200', RW, SBA.  Cues for upright posture and increased WB on RLE.  Pt also demonstrates safety with BW approach 1 step.  Pt safe to discharge home with assist and HH

## 2020-09-25 NOTE — PLAN OF CARE
Pt AO, VSS.  Pt slept between care.  Pain was managed with PO pain meds.  Pt ambulated in hallway last night with one assist.  Continuing to monitor.

## 2020-09-26 NOTE — PROGRESS NOTES
[facility]    Nerve Cath Post Op Call    Patient Name: Ирина Gutierrez  :  1955  MRN:  [id number]  Date of Discharge: 2020    Nerve Cath Post Op Call:    Analgesia:Excellent  Pain Score:0/10  Side Effects:None  Catheter Site:clean  Patient Controlled ON Q pump infusion rate: 10ml/hr  Catheter Plan:Will continue with plan at home without changes and The patient was instructed to call ON CALL Anesthesia provider for any questions or problems  Patient/Family instructed to call ON CALL anesthesia provider for any questions or problems.  Patient Follow Up:

## 2020-09-28 NOTE — PROGRESS NOTES
[facility]    Nerve Cath Post Op Call    Patient Name: Ирина Gutierrez  :  1955  MRN:  [id number]  Date of Discharge: 2020    Nerve Cath Post Op Call:    Catheter Plan:Patient/Family member report nerve catheter previously discontinued, tip intact  Patient/Family instructed to call ON CALL anesthesia provider for any questions or problems.  Patient Follow Up:

## 2020-11-13 DIAGNOSIS — S52.532A CLOSED COLLES' FRACTURE OF LEFT RADIUS, INITIAL ENCOUNTER: Primary | ICD-10-CM

## 2020-11-13 RX ORDER — OXYCODONE HYDROCHLORIDE 5 MG/1
5 TABLET ORAL EVERY 4 HOURS PRN
Qty: 30 TABLET | Refills: 0 | Status: SHIPPED | OUTPATIENT
Start: 2020-11-13

## 2020-11-15 ENCOUNTER — APPOINTMENT (OUTPATIENT)
Dept: PREADMISSION TESTING | Facility: HOSPITAL | Age: 65
End: 2020-11-15

## 2020-11-15 LAB — SARS-COV-2 RNA RESP QL NAA+PROBE: NOT DETECTED

## 2020-11-15 PROCEDURE — U0004 COV-19 TEST NON-CDC HGH THRU: HCPCS

## 2020-11-15 PROCEDURE — C9803 HOPD COVID-19 SPEC COLLECT: HCPCS

## 2022-06-13 ENCOUNTER — TELEPHONE (OUTPATIENT)
Dept: NEUROLOGY | Facility: OTHER | Age: 67
End: 2022-06-13

## 2022-06-13 NOTE — TELEPHONE ENCOUNTER
Patient called about referral to neurology. States the other day while she was driving she had black spot in vision. I did tell her she should have went to ER to be evaluated but she did not. I told her if not going to ER she needs to contact her PCP to get in right away and get documentation of what happened and send us a referral. Gave hub fax number.

## 2022-06-15 ENCOUNTER — TRANSCRIBE ORDERS (OUTPATIENT)
Dept: ADMINISTRATIVE | Facility: HOSPITAL | Age: 67
End: 2022-06-15

## 2022-06-15 DIAGNOSIS — H53.8 OTHER VISUAL DISTURBANCES: Primary | ICD-10-CM

## 2022-06-24 ENCOUNTER — HOSPITAL ENCOUNTER (OUTPATIENT)
Dept: CT IMAGING | Facility: HOSPITAL | Age: 67
Discharge: HOME | End: 2022-06-24
Admitting: NURSE PRACTITIONER

## 2022-06-24 DIAGNOSIS — H53.8 OTHER VISUAL DISTURBANCES: ICD-10-CM

## 2022-06-24 PROCEDURE — 70450 CT HEAD/BRAIN W/O DYE: CPT

## (undated) DEVICE — Device

## (undated) DEVICE — PK KN TOTL 10

## (undated) DEVICE — DIFFUSER: Brand: CORE, MAESTRO

## (undated) DEVICE — STERILE PVP: Brand: MEDLINE INDUSTRIES, INC.

## (undated) DEVICE — 2963 MEDIPORE SOFT CLOTH TAPE 3 IN X 10 YD 12 RLS/CS: Brand: 3M™ MEDIPORE™

## (undated) DEVICE — NDL SPINE 18G 31/2IN PNK

## (undated) DEVICE — 4.0MM ROUND FLUTED AGGRESSIVE

## (undated) DEVICE — CVR HNDL LIGHT RIGID

## (undated) DEVICE — ANTIBACTERIAL UNDYED BRAIDED (POLYGLACTIN 910), SYNTHETIC ABSORBABLE SUTURE: Brand: COATED VICRYL

## (undated) DEVICE — DRSNG PAD ABD 8X10IN STRL

## (undated) DEVICE — SKYLINE ANTERIOR CERVICAL PLATE SYSTEM DRILL 2.2 X 12MM: Brand: SKYLINE

## (undated) DEVICE — RIMMED SPEED PIN 45MM STERILE

## (undated) DEVICE — STRYKER PERFORMANCE SERIES SAGITTAL BLADE: Brand: STRYKER PERFORMANCE SERIES

## (undated) DEVICE — EXTENSION TAB: Brand: DEROYAL

## (undated) DEVICE — SPNG GZ WOVN 4X4IN 12PLY 10/BX STRL

## (undated) DEVICE — PULLOVER TOGA, 2X LARGE: Brand: FLYTE, SURGICOOL

## (undated) DEVICE — CANNULA,OXY,ADULT,SUPERSOFT,W/7'TUB,UC: Brand: MEDLINE

## (undated) DEVICE — RIMMED SPEED PIN 30MM STERILE

## (undated) DEVICE — NDL HYPO ECLPS SFTY 18G 1 1/2IN

## (undated) DEVICE — TB SXN FRAZIER 12F STRL

## (undated) DEVICE — GOWN,REINF,POLY,ECL,PP SLV,XL: Brand: MEDLINE

## (undated) DEVICE — CVR HNDL LT SURG ACCSSRY BLU STRL

## (undated) DEVICE — 3M™ WARMING BLANKET, UPPER BODY, 10 PER CASE, 42268: Brand: BAIR HUGGER™

## (undated) DEVICE — KITTNER SPONGE: Brand: DEROYAL

## (undated) DEVICE — SYS SKIN CLS DERMABOND PRINEO W/22CM MESH TP

## (undated) DEVICE — SYR LUERLOK 30CC

## (undated) DEVICE — JACKSON-PRATT 100CC BULB RESERVOIR: Brand: CARDINAL HEALTH

## (undated) DEVICE — ELECTRD BLD EDGE/INSUL1P 2.4X5.1MM STRL

## (undated) DEVICE — GLV SURG PREMIERPRO MIC LTX PF SZ8 BRN

## (undated) DEVICE — OIL CARTRIDGE: Brand: CORE, MAESTRO

## (undated) DEVICE — 3 BONE CEMENT MIXER: Brand: MIXEVAC

## (undated) DEVICE — MEDI-VAC NON-CONDUCTIVE SUCTION TUBING: Brand: CARDINAL HEALTH

## (undated) DEVICE — PUMP PAIN AUTOFUSER AUTO SELCT NOBOLUS 1TO14ML/HR 550ML DISP

## (undated) DEVICE — PK SPINE ORTHO 10

## (undated) DEVICE — BNDG ELAS W/CLIP 6IN 10YD LF STRL

## (undated) DEVICE — NEEDLE, QUINCKE, 18GX3.5": Brand: MEDLINE

## (undated) DEVICE — T4 PULLOVER TOGA, LARGE

## (undated) DEVICE — SUCTION CANISTER, 2500CC, RIGID: Brand: DEROYAL

## (undated) DEVICE — UNDERCAST PADDING: Brand: DEROYAL

## (undated) DEVICE — LO CONTOUR COLLAR: Brand: DEROYAL

## (undated) DEVICE — GLV SURG SIGNATURE TOUCH PF LTX 8 STRL BX/50

## (undated) DEVICE — HANDPIECE SET WITH HIGH FLOW TIP AND SUCTION TUBE: Brand: INTERPULSE

## (undated) DEVICE — APPL CHLORAPREP W/TINT 26ML BLU

## (undated) DEVICE — TB SXN FRAZIER 8F STRL

## (undated) DEVICE — SPNG GZ STRL 2S 4X4 12PLY

## (undated) DEVICE — 3M™ STERI-DRAPE™ INSTRUMENT POUCH 1018: Brand: STERI-DRAPE™

## (undated) DEVICE — GENESIS TROCHLEAR PIN 1/8 X 3: Brand: GENESIS

## (undated) DEVICE — GLV SURG SENSICARE W/ALOE PF LF 8 STRL

## (undated) DEVICE — DRAIN JACKSON PRATT 10FR 7MM: Brand: CARDINAL HEALTH

## (undated) DEVICE — Device: Brand: CHIN STRAPS-7 INCH

## (undated) DEVICE — MEDI-VAC YANKAUER SUCTION HANDLE W/BULBOUS TIP: Brand: CARDINAL HEALTH